# Patient Record
Sex: FEMALE | Race: WHITE | NOT HISPANIC OR LATINO | Employment: FULL TIME | URBAN - METROPOLITAN AREA
[De-identification: names, ages, dates, MRNs, and addresses within clinical notes are randomized per-mention and may not be internally consistent; named-entity substitution may affect disease eponyms.]

---

## 2017-06-21 ENCOUNTER — OFFICE VISIT (OUTPATIENT)
Dept: FAMILY MEDICINE | Facility: CLINIC | Age: 52
End: 2017-06-21
Payer: COMMERCIAL

## 2017-06-21 VITALS
OXYGEN SATURATION: 96 % | BODY MASS INDEX: 26.59 KG/M2 | HEIGHT: 62 IN | WEIGHT: 144.5 LBS | DIASTOLIC BLOOD PRESSURE: 90 MMHG | HEART RATE: 95 BPM | SYSTOLIC BLOOD PRESSURE: 172 MMHG | TEMPERATURE: 98 F

## 2017-06-21 DIAGNOSIS — I10 ESSENTIAL HYPERTENSION, BENIGN: Primary | ICD-10-CM

## 2017-06-21 PROCEDURE — 99999 PR PBB SHADOW E&M-EST. PATIENT-LVL III: CPT | Mod: PBBFAC,,, | Performed by: FAMILY MEDICINE

## 2017-06-21 PROCEDURE — 99214 OFFICE O/P EST MOD 30 MIN: CPT | Mod: S$GLB,,, | Performed by: FAMILY MEDICINE

## 2017-06-21 RX ORDER — AZITHROMYCIN 250 MG/1
TABLET, FILM COATED ORAL
COMMUNITY
Start: 2017-04-22 | End: 2017-06-21 | Stop reason: ALTCHOICE

## 2017-06-21 RX ORDER — LISINOPRIL 20 MG/1
20 TABLET ORAL DAILY
Qty: 30 TABLET | Refills: 3 | Status: SHIPPED | OUTPATIENT
Start: 2017-06-21 | End: 2017-08-22

## 2017-06-21 RX ORDER — HYDROCODONE BITARTRATE AND ACETAMINOPHEN 7.5; 325 MG/1; MG/1
TABLET ORAL
COMMUNITY
Start: 2017-03-31 | End: 2017-06-21

## 2017-06-21 RX ORDER — AZELASTINE 1 MG/ML
SPRAY, METERED NASAL
COMMUNITY
Start: 2017-04-22 | End: 2017-06-21 | Stop reason: ALTCHOICE

## 2017-06-21 RX ORDER — PREDNISONE 20 MG/1
TABLET ORAL
COMMUNITY
Start: 2017-04-22 | End: 2017-06-21 | Stop reason: ALTCHOICE

## 2017-06-21 RX ORDER — MELOXICAM 7.5 MG/1
TABLET ORAL
COMMUNITY
Start: 2017-03-31 | End: 2017-06-21

## 2017-06-21 RX ORDER — LISINOPRIL 5 MG/1
5 TABLET ORAL DAILY
COMMUNITY
End: 2017-06-21 | Stop reason: SDUPTHER

## 2017-06-21 RX ORDER — BENZONATATE 200 MG/1
CAPSULE ORAL
COMMUNITY
Start: 2017-04-22 | End: 2017-06-21 | Stop reason: ALTCHOICE

## 2017-06-21 NOTE — PROGRESS NOTES
[facility]  Progress Note    SUBJECTIVE:     Chief Complaint   Patient presents with    Medication Refill    Blood Pressure Check       HPI   Anusha Carranza  is a 52 y.o. female here for hypertension. She hasn't taken any medications today because she ran out. Has occasional blurry vision. She was told before that her bp was high but never followed up with anyone.    Review of patient's allergies indicates:   Allergen Reactions    Benadryl [diphenhydramine hcl]     Pcn [penicillins] Swelling       Past Medical History:   Diagnosis Date    Hypertension      Past Surgical History:   Procedure Laterality Date    HYSTERECTOMY       Family History   Problem Relation Age of Onset    Heart disease Mother     Diabetes Mother     Stroke Neg Hx      Social History   Substance Use Topics    Smoking status: Never Smoker    Smokeless tobacco: Not on file    Alcohol use No        Review of Systems   Constitutional: Negative for chills, fever and malaise/fatigue.   HENT: Negative.    Respiratory: Negative.  Negative for cough and shortness of breath.    Cardiovascular: Negative.  Negative for chest pain.   Gastrointestinal: Negative.  Negative for abdominal pain, nausea and vomiting.   Genitourinary: Negative.    Neurological: Negative for weakness and headaches.   All other systems reviewed and are negative.    OBJECTIVE:     Vitals:    06/21/17 1517   BP: (!) 172/90   Pulse: 95   Temp: 98 °F (36.7 °C)     Body mass index is 26.43 kg/m².    Physical Exam   Constitutional: She is oriented to person, place, and time and well-developed, well-nourished, and in no distress. No distress.   HENT:   Head: Normocephalic and atraumatic.   Eyes: Conjunctivae and EOM are normal.   Cardiovascular: Normal rate, regular rhythm and normal heart sounds.  Exam reveals no gallop and no friction rub.    No murmur heard.  Pulmonary/Chest: Effort normal and breath sounds normal. No respiratory distress. She has no wheezes. She  has no rales.   Abdominal: Soft. Bowel sounds are normal. She exhibits no distension. There is no tenderness.   Neurological: She is alert and oriented to person, place, and time.   Skin: Skin is warm. She is not diaphoretic.       Old records were reviewed. Labs and/or images were independently reviewed.    ASSESSMENT     1. Essential hypertension, benign        PLAN:     Essential hypertension, benign  -     Increase lisinopril (PRINIVIL,ZESTRIL) 20 MG tablet; Take 1 tablet (20 mg total) by mouth once daily.  Dispense: 30 tablet; Refill: 3  -     Educated patient to take medications as prescribed, and to record bp logs daily to bring along to next visit. Instructed patient to decrease salt intake. Also told patient to call if any new signs or symptoms develop.      RTC 1-2 weeks for BP management.    Bobby Allen MD  06/21/2017 3:34 PM

## 2017-06-29 ENCOUNTER — OFFICE VISIT (OUTPATIENT)
Dept: FAMILY MEDICINE | Facility: CLINIC | Age: 52
End: 2017-06-29
Payer: COMMERCIAL

## 2017-06-29 VITALS
TEMPERATURE: 98 F | BODY MASS INDEX: 26.59 KG/M2 | SYSTOLIC BLOOD PRESSURE: 126 MMHG | WEIGHT: 144.5 LBS | DIASTOLIC BLOOD PRESSURE: 80 MMHG | HEART RATE: 74 BPM | OXYGEN SATURATION: 97 % | HEIGHT: 62 IN

## 2017-06-29 DIAGNOSIS — Z00.00 ROUTINE PHYSICAL EXAMINATION: Primary | ICD-10-CM

## 2017-06-29 DIAGNOSIS — Z12.31 ENCOUNTER FOR SCREENING MAMMOGRAM FOR BREAST CANCER: ICD-10-CM

## 2017-06-29 DIAGNOSIS — I10 ESSENTIAL HYPERTENSION, BENIGN: ICD-10-CM

## 2017-06-29 PROCEDURE — 99999 PR PBB SHADOW E&M-EST. PATIENT-LVL III: CPT | Mod: PBBFAC,,, | Performed by: FAMILY MEDICINE

## 2017-06-29 PROCEDURE — 99396 PREV VISIT EST AGE 40-64: CPT | Mod: S$GLB,,, | Performed by: FAMILY MEDICINE

## 2017-06-29 NOTE — PROGRESS NOTES
[facility]  Progress Note    SUBJECTIVE:     Chief Complaint   Patient presents with    Hypertension    Follow-up       HPI   Anusha Carranza  is a 52 y.o. female here for routine physical exam. Doing well and has no new complaints/problems at this time. Her HTN been running well on lisinopril.    Review of patient's allergies indicates:   Allergen Reactions    Benadryl [diphenhydramine hcl]     Pcn [penicillins] Swelling       Past Medical History:   Diagnosis Date    Hypertension      Past Surgical History:   Procedure Laterality Date    HYSTERECTOMY       Family History   Problem Relation Age of Onset    Heart disease Mother     Diabetes Mother     Stroke Neg Hx      Social History   Substance Use Topics    Smoking status: Never Smoker    Smokeless tobacco: Not on file    Alcohol use No        Review of Systems   Constitutional: Negative for chills, diaphoresis and fever.   HENT: Negative for congestion, ear pain and sore throat.    Eyes: Negative for photophobia and discharge.   Respiratory: Negative for cough, shortness of breath and wheezing.    Cardiovascular: Negative for chest pain and palpitations.   Gastrointestinal: Negative for abdominal pain, constipation, diarrhea, nausea and vomiting.   Genitourinary: Negative for dysuria and hematuria.   Musculoskeletal: Negative for back pain and myalgias.   Skin: Negative for itching and rash.   Neurological: Negative for dizziness, sensory change, focal weakness, weakness and headaches.   All other systems reviewed and are negative.    OBJECTIVE:     Vitals:    06/29/17 0816   BP: 126/80   Pulse: 74   Temp: 98.2 °F (36.8 °C)     Body mass index is 26.43 kg/m².    Physical Exam   Constitutional: She is oriented to person, place, and time and well-developed, well-nourished, and in no distress. No distress.   HENT:   Head: Normocephalic and atraumatic.   Right Ear: Tympanic membrane is not perforated, not erythematous and not bulging. No  hemotympanum.   Left Ear: Tympanic membrane is not perforated, not erythematous and not bulging. No hemotympanum.   Mouth/Throat: Oropharynx is clear and moist. No oropharyngeal exudate.   Eyes: Conjunctivae and EOM are normal. Pupils are equal, round, and reactive to light.   Neck: No thyromegaly present.   Cardiovascular: Normal rate, regular rhythm and normal heart sounds.  Exam reveals no gallop and no friction rub.    No murmur heard.  Pulmonary/Chest: Effort normal and breath sounds normal. No respiratory distress. She has no wheezes. She has no rales.   Abdominal: Soft. Bowel sounds are normal. She exhibits no distension. There is no tenderness. There is no rebound and no guarding.   Musculoskeletal: Normal range of motion. She exhibits no edema or tenderness.   Lymphadenopathy:     She has no cervical adenopathy.   Neurological: She is alert and oriented to person, place, and time.   Skin: Skin is warm. No rash noted. She is not diaphoretic. No erythema.       Old records were reviewed. Labs and/or images were independently reviewed.    ASSESSMENT     1. Routine physical examination    2. Essential hypertension, benign    3. Encounter for screening mammogram for breast cancer        PLAN:     Routine physical examination  -     Lipid panel; Future; Expected date: 06/29/2017  -     CBC auto differential; Future  -     Comprehensive metabolic panel; Future  -     Hemoglobin A1c; Future  -     T4, free; Future  -     TSH; Future  -     Hepatitis panel, acute; Future; Expected date: 06/29/2017  -     HIV-1 and HIV-2 antibodies; Future; Expected date: 06/29/2017  -     RPR; Future; Expected date: 06/29/2017  -     We briefly discussed diet, exercise, and routine preventive exams. All questions and comments addressed.    Essential hypertension, benign   -     Stable. Continue current regimen.    Encounter for screening mammogram for breast cancer  -     Mammo Digital Screening Bilat with CAD; Future; Expected  date: 06/29/2017      RTC PRN    Bobby Allen MD  06/29/2017 8:43 AM

## 2017-07-06 ENCOUNTER — LAB VISIT (OUTPATIENT)
Dept: LAB | Facility: HOSPITAL | Age: 52
End: 2017-07-06
Attending: FAMILY MEDICINE
Payer: COMMERCIAL

## 2017-07-06 DIAGNOSIS — Z00.00 ROUTINE PHYSICAL EXAMINATION: ICD-10-CM

## 2017-07-06 LAB
ALBUMIN SERPL BCP-MCNC: 3.9 G/DL
ALP SERPL-CCNC: 110 U/L
ALT SERPL W/O P-5'-P-CCNC: 13 U/L
ANION GAP SERPL CALC-SCNC: 5 MMOL/L
AST SERPL-CCNC: 19 U/L
BASOPHILS # BLD AUTO: 0.02 K/UL
BASOPHILS NFR BLD: 0.3 %
BILIRUB SERPL-MCNC: 0.5 MG/DL
BUN SERPL-MCNC: 13 MG/DL
CALCIUM SERPL-MCNC: 9.9 MG/DL
CHLORIDE SERPL-SCNC: 103 MMOL/L
CHOLEST/HDLC SERPL: 3.7 {RATIO}
CO2 SERPL-SCNC: 34 MMOL/L
CREAT SERPL-MCNC: 0.8 MG/DL
DIFFERENTIAL METHOD: ABNORMAL
EOSINOPHIL # BLD AUTO: 0.2 K/UL
EOSINOPHIL NFR BLD: 2.6 %
ERYTHROCYTE [DISTWIDTH] IN BLOOD BY AUTOMATED COUNT: 12.7 %
EST. GFR  (AFRICAN AMERICAN): >60 ML/MIN/1.73 M^2
EST. GFR  (NON AFRICAN AMERICAN): >60 ML/MIN/1.73 M^2
GLUCOSE SERPL-MCNC: 95 MG/DL
HAV IGM SERPL QL IA: NEGATIVE
HBV CORE IGM SERPL QL IA: NEGATIVE
HBV SURFACE AG SERPL QL IA: NEGATIVE
HCT VFR BLD AUTO: 43.2 %
HCV AB SERPL QL IA: NEGATIVE
HDL/CHOLESTEROL RATIO: 26.8 %
HDLC SERPL-MCNC: 213 MG/DL
HDLC SERPL-MCNC: 57 MG/DL
HGB BLD-MCNC: 14.3 G/DL
HIV 1+2 AB+HIV1 P24 AG SERPL QL IA: NEGATIVE
LDLC SERPL CALC-MCNC: 131.6 MG/DL
LYMPHOCYTES # BLD AUTO: 2 K/UL
LYMPHOCYTES NFR BLD: 31.8 %
MCH RBC QN AUTO: 29.7 PG
MCHC RBC AUTO-ENTMCNC: 33.1 %
MCV RBC AUTO: 90 FL
MONOCYTES # BLD AUTO: 0.7 K/UL
MONOCYTES NFR BLD: 11.8 %
NEUTROPHILS # BLD AUTO: 3.3 K/UL
NEUTROPHILS NFR BLD: 53.3 %
NONHDLC SERPL-MCNC: 156 MG/DL
PLATELET # BLD AUTO: 351 K/UL
PMV BLD AUTO: 9.8 FL
POTASSIUM SERPL-SCNC: 4.6 MMOL/L
PROT SERPL-MCNC: 7.9 G/DL
RBC # BLD AUTO: 4.81 M/UL
SODIUM SERPL-SCNC: 142 MMOL/L
T4 FREE SERPL-MCNC: 1.02 NG/DL
TRIGL SERPL-MCNC: 122 MG/DL
TSH SERPL DL<=0.005 MIU/L-ACNC: 1.85 UIU/ML
WBC # BLD AUTO: 6.19 K/UL

## 2017-07-06 PROCEDURE — 84439 ASSAY OF FREE THYROXINE: CPT

## 2017-07-06 PROCEDURE — 86592 SYPHILIS TEST NON-TREP QUAL: CPT

## 2017-07-06 PROCEDURE — 85025 COMPLETE CBC W/AUTO DIFF WBC: CPT

## 2017-07-06 PROCEDURE — 80061 LIPID PANEL: CPT

## 2017-07-06 PROCEDURE — 80053 COMPREHEN METABOLIC PANEL: CPT

## 2017-07-06 PROCEDURE — 86703 HIV-1/HIV-2 1 RESULT ANTBDY: CPT

## 2017-07-06 PROCEDURE — 84443 ASSAY THYROID STIM HORMONE: CPT

## 2017-07-06 PROCEDURE — 80074 ACUTE HEPATITIS PANEL: CPT

## 2017-07-06 PROCEDURE — 83036 HEMOGLOBIN GLYCOSYLATED A1C: CPT

## 2017-07-06 PROCEDURE — 36415 COLL VENOUS BLD VENIPUNCTURE: CPT | Mod: PO

## 2017-07-07 LAB
ESTIMATED AVG GLUCOSE: 108 MG/DL
HBA1C MFR BLD HPLC: 5.4 %
RPR SER QL: NORMAL

## 2017-08-22 ENCOUNTER — TELEPHONE (OUTPATIENT)
Dept: FAMILY MEDICINE | Facility: CLINIC | Age: 52
End: 2017-08-22

## 2017-08-22 DIAGNOSIS — I10 ESSENTIAL HYPERTENSION, BENIGN: ICD-10-CM

## 2017-08-22 RX ORDER — LOSARTAN POTASSIUM 25 MG/1
25 TABLET ORAL DAILY
Qty: 90 TABLET | Refills: 3 | Status: SHIPPED | OUTPATIENT
Start: 2017-08-22 | End: 2017-08-23

## 2017-08-22 NOTE — TELEPHONE ENCOUNTER
----- Message from Autumn Pitts sent at 8/22/2017  9:40 AM CDT -----  Patient states blood pressure medication is causing her to have a cough/ticke in throat. Please call at [phone] thank you!

## 2017-08-22 NOTE — TELEPHONE ENCOUNTER
Spoke w/patient, requesting a different B/P medication. States Lisinopril causes her to cough constantly. Please advise.

## 2017-08-23 RX ORDER — LISINOPRIL 20 MG/1
20 TABLET ORAL DAILY
Qty: 30 TABLET | Refills: 3 | Status: SHIPPED | OUTPATIENT
Start: 2017-08-23 | End: 2017-09-12 | Stop reason: SINTOL

## 2017-09-12 ENCOUNTER — OFFICE VISIT (OUTPATIENT)
Dept: FAMILY MEDICINE | Facility: CLINIC | Age: 52
End: 2017-09-12
Payer: COMMERCIAL

## 2017-09-12 VITALS
OXYGEN SATURATION: 97 % | HEIGHT: 62 IN | HEART RATE: 98 BPM | BODY MASS INDEX: 26.64 KG/M2 | WEIGHT: 144.75 LBS | DIASTOLIC BLOOD PRESSURE: 82 MMHG | SYSTOLIC BLOOD PRESSURE: 122 MMHG | TEMPERATURE: 98 F

## 2017-09-12 DIAGNOSIS — I10 ESSENTIAL HYPERTENSION, BENIGN: Primary | ICD-10-CM

## 2017-09-12 PROCEDURE — 99214 OFFICE O/P EST MOD 30 MIN: CPT | Mod: S$GLB,,, | Performed by: FAMILY MEDICINE

## 2017-09-12 PROCEDURE — 3008F BODY MASS INDEX DOCD: CPT | Mod: S$GLB,,, | Performed by: FAMILY MEDICINE

## 2017-09-12 PROCEDURE — 99999 PR PBB SHADOW E&M-EST. PATIENT-LVL III: CPT | Mod: PBBFAC,,, | Performed by: FAMILY MEDICINE

## 2017-09-12 PROCEDURE — 3074F SYST BP LT 130 MM HG: CPT | Mod: S$GLB,,, | Performed by: FAMILY MEDICINE

## 2017-09-12 PROCEDURE — 3079F DIAST BP 80-89 MM HG: CPT | Mod: S$GLB,,, | Performed by: FAMILY MEDICINE

## 2017-09-12 RX ORDER — AMLODIPINE BESYLATE 5 MG/1
5 TABLET ORAL DAILY
Qty: 90 TABLET | Refills: 3 | Status: SHIPPED | OUTPATIENT
Start: 2017-09-12 | End: 2018-10-16

## 2017-09-12 NOTE — PROGRESS NOTES
[facility]  Progress Note    SUBJECTIVE:     Chief Complaint   Patient presents with    Medication Reaction     b/p meds causing headaches       HPI   Anusha Carranza  is a 52 y.o. female here for blood pressure issues. She has been having a dry cough recently for the past couple weeks. No sputum production, shortness of breath, fevers, chills. No known sick contacts.    Review of patient's allergies indicates:   Allergen Reactions    Benadryl [diphenhydramine hcl]     Pcn [penicillins] Swelling       Past Medical History:   Diagnosis Date    Hypertension      Past Surgical History:   Procedure Laterality Date    HYSTERECTOMY       Family History   Problem Relation Age of Onset    Heart disease Mother     Diabetes Mother     Stroke Neg Hx      Social History   Substance Use Topics    Smoking status: Never Smoker    Smokeless tobacco: Never Used    Alcohol use No        Review of Systems   Constitutional: Negative for chills, fever and malaise/fatigue.   HENT: Negative.    Respiratory: Positive for cough (dry). Negative for shortness of breath.    Cardiovascular: Negative.  Negative for chest pain.   Gastrointestinal: Negative.  Negative for abdominal pain, nausea and vomiting.   Genitourinary: Negative.    Neurological: Negative for weakness and headaches.   All other systems reviewed and are negative.    OBJECTIVE:     Vitals:    09/12/17 1557   BP: 122/82   Pulse: 98   Temp: 98.4 °F (36.9 °C)     Body mass index is 26.46 kg/m².    Physical Exam   Constitutional: She is oriented to person, place, and time and well-developed, well-nourished, and in no distress. No distress.   HENT:   Head: Normocephalic and atraumatic.   Nose: Nose normal.   Eyes: Conjunctivae and EOM are normal.   Cardiovascular: Normal rate, regular rhythm and normal heart sounds.  Exam reveals no gallop and no friction rub.    No murmur heard.  Pulmonary/Chest: Effort normal and breath sounds normal. No respiratory distress.  She has no wheezes. She has no rales. She exhibits no tenderness.   Abdominal: Soft. Bowel sounds are normal. She exhibits no distension. There is no tenderness. There is no rebound.   Neurological: She is alert and oriented to person, place, and time.   Skin: Skin is warm. She is not diaphoretic.       Old records were reviewed. Labs and/or images were independently reviewed.    ASSESSMENT     1. Essential hypertension, benign        PLAN:     Essential hypertension, benign  -     Start amlodipine (NORVASC) 5 MG tablet; Take 1 tablet (5 mg total) by mouth once daily.  Dispense: 90 tablet; Refill: 3  -     Stop lisinpril due to dry cough.  -     Educated patient to take medications as prescribed, and to record bp logs daily to bring along to next visit. Instructed patient to decrease salt intake. Also told patient to call if any new signs or symptoms develop.    RTC PRN or 2 weeks for follow-up.    Bobby Allen MD  09/12/2017 5:35 PM

## 2017-09-26 ENCOUNTER — TELEPHONE (OUTPATIENT)
Dept: FAMILY MEDICINE | Facility: CLINIC | Age: 52
End: 2017-09-26

## 2017-09-26 NOTE — TELEPHONE ENCOUNTER
----- Message from Brandon Bobby sent at 9/26/2017 11:54 AM CDT -----  Contact: Self/[phone]  Patient states that the medication:  amlodipine (NORVASC) 5 MG tablet has her very fatigue. Patient would like to speak to the staff regarding this matter.  Thank you.

## 2017-09-26 NOTE — TELEPHONE ENCOUNTER
Left message for patient to call office.     I, Lovely Velasquez, performed the initial face to face bedside interview with this patient regarding history of present illness, review of symptoms and relevant past medical, social and family history.  I completed an independent physical examination.  I was the initial provider who evaluated this patient. I have signed out the follow up of any pending tests (i.e. labs, radiological studies) to the ACP.  I have communicated the patient’s plan of care and disposition with the ACP.

## 2017-09-27 NOTE — TELEPHONE ENCOUNTER
Spoke w/patient, states the medication Amlodipine is causing her to be fatigue. States it makes her feel like she drunk or loaded. B/P readings before taking medication: 132/78, 132/84, 136/84, 132/78. No complaints of symptoms verbalized. Please advise.

## 2017-09-27 NOTE — TELEPHONE ENCOUNTER
----- Message from Brandon Bobby sent at 9/26/2017 11:54 AM CDT -----  Contact: Self/[phone]  Patient states that the medication:  amlodipine (NORVASC) 5 MG tablet has her very fatigue. Patient would like to speak to the staff regarding this matter.  Thank you.

## 2018-08-23 RX ORDER — LOSARTAN POTASSIUM 25 MG/1
TABLET ORAL
Qty: 90 TABLET | Refills: 2 | OUTPATIENT
Start: 2018-08-23

## 2018-08-31 DIAGNOSIS — Z12.39 BREAST CANCER SCREENING: ICD-10-CM

## 2018-09-26 ENCOUNTER — OFFICE VISIT (OUTPATIENT)
Dept: OBSTETRICS AND GYNECOLOGY | Facility: CLINIC | Age: 53
End: 2018-09-26
Payer: COMMERCIAL

## 2018-09-26 VITALS
BODY MASS INDEX: 26.74 KG/M2 | HEIGHT: 62 IN | SYSTOLIC BLOOD PRESSURE: 160 MMHG | DIASTOLIC BLOOD PRESSURE: 80 MMHG | WEIGHT: 145.31 LBS

## 2018-09-26 DIAGNOSIS — N95.2 VAGINAL ATROPHY: Primary | ICD-10-CM

## 2018-09-26 DIAGNOSIS — N89.8 VAGINAL DISCHARGE: ICD-10-CM

## 2018-09-26 PROCEDURE — 99999 PR PBB SHADOW E&M-EST. PATIENT-LVL III: CPT | Mod: PBBFAC,,, | Performed by: OBSTETRICS & GYNECOLOGY

## 2018-09-26 PROCEDURE — 87491 CHLMYD TRACH DNA AMP PROBE: CPT

## 2018-09-26 PROCEDURE — 3008F BODY MASS INDEX DOCD: CPT | Mod: CPTII,S$GLB,, | Performed by: OBSTETRICS & GYNECOLOGY

## 2018-09-26 PROCEDURE — 87660 TRICHOMONAS VAGIN DIR PROBE: CPT

## 2018-09-26 PROCEDURE — 99204 OFFICE O/P NEW MOD 45 MIN: CPT | Mod: S$GLB,,, | Performed by: OBSTETRICS & GYNECOLOGY

## 2018-09-26 RX ORDER — ESTRADIOL 0.1 MG/G
1 CREAM VAGINAL NIGHTLY
Qty: 42.5 G | Refills: 1 | Status: SHIPPED | OUTPATIENT
Start: 2018-09-26 | End: 2019-02-04

## 2018-09-26 RX ORDER — LEVOTHYROXINE SODIUM 50 UG/1
50 TABLET ORAL DAILY
Refills: 1 | COMMUNITY
Start: 2018-09-18 | End: 2019-03-25 | Stop reason: SDUPTHER

## 2018-09-26 RX ORDER — LOSARTAN POTASSIUM 25 MG/1
25 TABLET ORAL DAILY
Refills: 0 | COMMUNITY
Start: 2018-09-21 | End: 2018-10-16 | Stop reason: SDUPTHER

## 2018-09-26 NOTE — PROGRESS NOTES
"History & Physical  Gynecology      SUBJECTIVE:     Chief Complaint: Consult ( painful intercourse Was told she in the first stage of menopause)       History of Present Illness:  Ms. Carranza is a 53 year old female who presents to clinic for second opinion about estrogen suppository that was prescribed for vaginal atrophy by Dr. Pennington at [facility]. She reports that she has been experiencing discomfort with intercourse. She would have vaginal spotting after intercourse on certain occasions. She reports that Dr. Pennington suggested that the vaginal irritation and dyspareunia that she has been experiencing is likely due to postmenopausal vaginal atrophy. She is concerned with the risk of cancer with the use of estrogen. She also complains that she has been noticing thick white discharge when she wipes. She denies foul smell and itching. She admits to douching 2-3x a week just so that she does not feel "dirty."      Review of patient's allergies indicates:   Allergen Reactions    Benadryl [diphenhydramine hcl]     Pcn [penicillins] Swelling       Past Medical History:   Diagnosis Date    Hypertension      Past Surgical History:   Procedure Laterality Date    CHOLECYSTECTOMY      EYE SURGERY Bilateral 2009    HYSTERECTOMY       OB History      Para Term  AB Living    4         4    SAB TAB Ectopic Multiple Live Births                     Family History   Problem Relation Age of Onset    Heart disease Mother     Diabetes Mother     Stroke Neg Hx      Social History     Tobacco Use    Smoking status: Never Smoker    Smokeless tobacco: Never Used   Substance Use Topics    Alcohol use: Yes     Frequency: Monthly or less    Drug use: No       Current Outpatient Medications   Medication Sig    levothyroxine (SYNTHROID) 50 MCG tablet Take 50 mcg by mouth once daily.    losartan (COZAAR) 25 MG tablet Take 25 mg by mouth once daily.    amlodipine (NORVASC) 5 MG tablet Take 1 tablet (5 mg total) by " mouth once daily.    estradiol (ESTRACE) 0.01 % (0.1 mg/gram) vaginal cream Place 1 g vaginally every evening.     No current facility-administered medications for this visit.      Review of Systems:  Review of Systems   Constitutional: Negative for chills and fever.   Eyes: Negative for visual disturbance.   Respiratory: Negative for cough and wheezing.    Cardiovascular: Negative for chest pain and palpitations.   Gastrointestinal: Negative for abdominal pain, nausea and vomiting.   Genitourinary: Positive for dyspareunia, vaginal bleeding and vaginal discharge. Negative for dysuria, frequency, hematuria, pelvic pain and vaginal pain.        Vaginal spotting after intercourse   Neurological: Negative for headaches.        OBJECTIVE:     Physical Exam:  Physical Exam   Constitutional: She is oriented to person, place, and time. She appears well-developed and well-nourished. No distress.   Cardiovascular: Normal rate and normal heart sounds.   Pulmonary/Chest: Effort normal. No respiratory distress.   Abdominal: Soft. There is no tenderness.   Genitourinary: Vaginal discharge found.   Genitourinary Comments: Uterus and cervix surgically absent. Vulvar and vaginal tissue dry and thinned. Vaginal cuff intact     Neurological: She is alert and oriented to person, place, and time.   Skin: Skin is warm and dry.   Psychiatric: She has a normal mood and affect.   Vitals reviewed.      ASSESSMENT:       ICD-10-CM ICD-9-CM    1. Vaginal atrophy N95.2 627.3 estradiol (ESTRACE) 0.01 % (0.1 mg/gram) vaginal cream   2. Vaginal discharge N89.8 623.5 Vaginosis Screen by DNA Probe      C. trachomatis/N. gonorrhoeae by AMP DNA      Plan:      Anusha was seen today for consult.    Diagnoses and all orders for this visit:    Vaginal atrophy  -     estradiol (ESTRACE) 0.01 % (0.1 mg/gram) vaginal cream; Place 1 g vaginally every evening.  - Discussed with patient the effects of menopause on vaginal tissue. Suggested estrogen cream for  vaginal atrophy, dyspareunia and irritation with intercourse. Discussed the side effects of systemic estrogen vs. Vaginal estrogen.    Vaginal discharge  -     Vaginosis Screen by DNA Probe  -     C. trachomatis/N. gonorrhoeae by AMP DNA        Orders Placed This Encounter   Procedures    Vaginosis Screen by DNA Probe    C. trachomatis/N. gonorrhoeae by AMP DNA       Follow-up if symptoms worsen or fail to improve.     Counseling time: 30 minutes    Alicia Stevenson

## 2018-09-26 NOTE — PATIENT INSTRUCTIONS
Understanding Menopause  Menopause marks the point where youve gone 12 months in a row without a period. The average age for this is around 51, but it can happen at younger or older ages. During the months or years before menopause, your body goes through many changes. It may be helpful to understand these changes and what you can do about the symptoms that result.     Use a portable fan to help stay cool.    Symptoms  Perimenopause is sometimes called the menopause transition. It happens in the months or years before menopause. It may begin when you reach your mid-40s. During this time, your estrogen levels go up and down and then decrease. As a result, you may notice some of the following symptoms:  · Menstrual periods that come more or less often than usual  · Menstrual periods that are lighter or heavier than normal  · Increased premenstrual syndrome (PMS) symptoms  · Hot flashes  · Night sweats  · Mood swings  · Vaginal dryness with possible painful sexual activity  · Difficulty going to sleep or staying asleep  · Decreased sexual drive and function  · Urinating frequently  It is important to remember that you could become pregnant until 12 months have passed since your last menstrual period. Ask your healthcare provider about birth control choices.   Controlling symptoms  Your healthcare provider may suggest pills or an intrauterine device (IUD) that contain the hormone progesterone. This can make your periods more regular and prevent excess bleeding. If you have symptoms due to lower estrogen levels, your healthcare provider may suggest pills that contain estrogen and/or progesterone. This is called hormone therapy (HT).  There are also other prescription medicines that help control some of the bothersome symptoms, like hot flashes, mood swings, and vaginal dryness.  Other ways for you to deal with symptoms are listed below.  · Hot flashes. Wear layers that you can remove. Try all-cotton clothing, sheets,  and blankets. Keep a glass of cold water by your bed.  · Pain during sex. You can buy a water-based lubricant or vaginal moisturizer in the drugstore that may help. Your healthcare provider may also prescribe an estrogen cream for your vagina.  · Mood swings. Talking to friends who are going through the same changes can sometimes help.  Date Last Reviewed: 12/1/2016  © 1815-2413 [employer]. [address] [place] [address], [place] [address]. All rights reserved. This information is not intended as a substitute for professional medical care. Always follow your healthcare professional's instructions.

## 2018-09-27 ENCOUNTER — OFFICE VISIT (OUTPATIENT)
Dept: URGENT CARE | Facility: CLINIC | Age: 53
End: 2018-09-27
Payer: COMMERCIAL

## 2018-09-27 VITALS
BODY MASS INDEX: 26.68 KG/M2 | HEIGHT: 62 IN | HEART RATE: 85 BPM | OXYGEN SATURATION: 99 % | WEIGHT: 145 LBS | RESPIRATION RATE: 18 BRPM | SYSTOLIC BLOOD PRESSURE: 154 MMHG | TEMPERATURE: 98 F | DIASTOLIC BLOOD PRESSURE: 89 MMHG

## 2018-09-27 DIAGNOSIS — M79.602 LEFT ARM PAIN: Primary | ICD-10-CM

## 2018-09-27 LAB
C TRACH DNA SPEC QL NAA+PROBE: NOT DETECTED
CANDIDA RRNA VAG QL PROBE: NEGATIVE
G VAGINALIS RRNA GENITAL QL PROBE: POSITIVE
N GONORRHOEA DNA SPEC QL NAA+PROBE: NOT DETECTED
T VAGINALIS RRNA GENITAL QL PROBE: NEGATIVE

## 2018-09-27 PROCEDURE — 3077F SYST BP >= 140 MM HG: CPT | Mod: CPTII,S$GLB,, | Performed by: PHYSICIAN ASSISTANT

## 2018-09-27 PROCEDURE — 99214 OFFICE O/P EST MOD 30 MIN: CPT | Mod: S$GLB,,, | Performed by: PHYSICIAN ASSISTANT

## 2018-09-27 PROCEDURE — 3079F DIAST BP 80-89 MM HG: CPT | Mod: CPTII,S$GLB,, | Performed by: PHYSICIAN ASSISTANT

## 2018-09-27 RX ORDER — SULFAMETHOXAZOLE AND TRIMETHOPRIM 800; 160 MG/1; MG/1
1 TABLET ORAL 2 TIMES DAILY
Qty: 14 TABLET | Refills: 0 | Status: SHIPPED | OUTPATIENT
Start: 2018-09-27 | End: 2018-10-16 | Stop reason: ALTCHOICE

## 2018-09-27 NOTE — PROGRESS NOTES
"Subjective:       Patient ID: Anusha Carranza is a 53 y.o. female.    Vitals:  height is 5' 2" (1.575 m) and weight is 65.8 kg (145 lb). Her temperature is 98.1 °F (36.7 °C). Her blood pressure is 154/89 (abnormal) and her pulse is 85. Her respiration is 18 and oxygen saturation is 99%.     Chief Complaint: Arm Pain    53-year-old female presents with left elbow and arm pain for 1 week.  She reports receiving a steroid injection into the lateral epicondyle.  Since then the pain has been getting worse and her forearm arm and hand has started to swell.  She denies fevers or chills or myalgias.       Arm Pain    The incident occurred 5 to 7 days ago. The incident occurred at home. Injury mechanism: injection to Elbow. The pain is present in the left elbow and left forearm. The quality of the pain is described as aching. The pain radiates to the left arm. The pain is at a severity of 10/10. The pain is severe. The pain has been constant since the incident. Pertinent negatives include no chest pain. The symptoms are aggravated by movement, palpation and lifting. She has tried heat for the symptoms. The treatment provided no relief.     Review of Systems   Constitution: Negative for chills and fever.   HENT: Negative for sore throat.    Eyes: Negative for blurred vision.   Cardiovascular: Negative for chest pain.   Respiratory: Negative for shortness of breath.    Skin: Negative for rash.   Musculoskeletal: Positive for joint pain and joint swelling. Negative for back pain.   Gastrointestinal: Negative for abdominal pain, diarrhea, nausea and vomiting.   Neurological: Negative for headaches.   Psychiatric/Behavioral: The patient is not nervous/anxious.    All other systems reviewed and are negative.      Objective:      Physical Exam   Constitutional: She appears well-developed and well-nourished. She is active. No distress.   HENT:   Head: Normocephalic and atraumatic.   Right Ear: Hearing and external ear normal.   Left " Ear: Hearing and external ear normal.   Nose: Nose normal. No nasal deformity. No epistaxis.   Mouth/Throat: Oropharynx is clear and moist and mucous membranes are normal.   Eyes: Conjunctivae and lids are normal. No scleral icterus.   Neck: Trachea normal and normal range of motion.   Cardiovascular: Intact distal pulses and normal pulses.   Pulmonary/Chest: Effort normal. No stridor. No respiratory distress.   Musculoskeletal:        Left elbow: She exhibits swelling. She exhibits normal range of motion. Tenderness found. Radial head and lateral epicondyle tenderness noted.        Right forearm: She exhibits no tenderness and no swelling (23 cm middle 1/3).        Left forearm: She exhibits tenderness (warmth without erythema. ) and swelling (24.5 cm middle 1/3).   Neurological: She is alert. She has normal strength. She is not disoriented. No sensory deficit. GCS eye subscore is 4. GCS verbal subscore is 5. GCS motor subscore is 6.   Skin: Skin is warm, dry and intact. Capillary refill takes less than 2 seconds. She is not diaphoretic.   Psychiatric: She has a normal mood and affect. Her speech is normal and behavior is normal. She is attentive.   Nursing note and vitals reviewed.      Assessment:       1. Left arm pain        Plan:       Pt instructed to F/U with Dr. Tiffanie JUDGE. I suspect iatrogenic infectious process.           Left arm pain  -     sulfamethoxazole-trimethoprim 800-160mg (BACTRIM DS) 800-160 mg Tab; Take 1 tablet by mouth 2 (two) times daily.  Dispense: 14 tablet; Refill: 0      Patient Instructions   Please return here or go to the Emergency Department for any concerns or worsening of condition.  If you were prescribed antibiotics, please take them to completion.  If you were prescribed a narcotic medication, do not drive or operate heavy equipment or machinery while taking these medications.  Please follow up with your primary care doctor or specialist as needed.    If you  smoke, please stop  smoking.

## 2018-10-16 ENCOUNTER — OFFICE VISIT (OUTPATIENT)
Dept: FAMILY MEDICINE | Facility: CLINIC | Age: 53
End: 2018-10-16
Payer: COMMERCIAL

## 2018-10-16 VITALS
DIASTOLIC BLOOD PRESSURE: 92 MMHG | SYSTOLIC BLOOD PRESSURE: 170 MMHG | HEIGHT: 62 IN | HEART RATE: 81 BPM | OXYGEN SATURATION: 97 % | TEMPERATURE: 98 F | BODY MASS INDEX: 27.14 KG/M2 | WEIGHT: 147.5 LBS

## 2018-10-16 DIAGNOSIS — Z12.31 ENCOUNTER FOR SCREENING MAMMOGRAM FOR BREAST CANCER: ICD-10-CM

## 2018-10-16 DIAGNOSIS — I10 ESSENTIAL HYPERTENSION, BENIGN: Primary | ICD-10-CM

## 2018-10-16 PROCEDURE — 3080F DIAST BP >= 90 MM HG: CPT | Mod: CPTII,S$GLB,, | Performed by: FAMILY MEDICINE

## 2018-10-16 PROCEDURE — 3077F SYST BP >= 140 MM HG: CPT | Mod: CPTII,S$GLB,, | Performed by: FAMILY MEDICINE

## 2018-10-16 PROCEDURE — 99999 PR PBB SHADOW E&M-EST. PATIENT-LVL III: CPT | Mod: PBBFAC,,, | Performed by: FAMILY MEDICINE

## 2018-10-16 PROCEDURE — 3008F BODY MASS INDEX DOCD: CPT | Mod: CPTII,S$GLB,, | Performed by: FAMILY MEDICINE

## 2018-10-16 PROCEDURE — 99214 OFFICE O/P EST MOD 30 MIN: CPT | Mod: S$GLB,,, | Performed by: FAMILY MEDICINE

## 2018-10-16 RX ORDER — INDOMETHACIN 25 MG/1
CAPSULE ORAL
Refills: 0 | COMMUNITY
Start: 2018-09-28 | End: 2019-02-04

## 2018-10-16 RX ORDER — DICLOFENAC SODIUM 10 MG/G
GEL TOPICAL
Refills: 0 | COMMUNITY
Start: 2018-10-11 | End: 2019-02-04

## 2018-10-16 RX ORDER — HYDROCODONE BITARTRATE AND ACETAMINOPHEN 7.5; 325 MG/1; MG/1
1 TABLET ORAL EVERY 12 HOURS PRN
Refills: 0 | COMMUNITY
Start: 2018-09-20 | End: 2019-02-04

## 2018-10-16 RX ORDER — LOSARTAN POTASSIUM 50 MG/1
50 TABLET ORAL DAILY
Qty: 90 TABLET | Refills: 3 | Status: SHIPPED | OUTPATIENT
Start: 2018-10-16 | End: 2019-03-25

## 2018-10-16 RX ORDER — MELOXICAM 7.5 MG/1
TABLET ORAL
Refills: 0 | COMMUNITY
Start: 2018-09-20 | End: 2019-02-04

## 2018-10-16 NOTE — PROGRESS NOTES
[facility]  Progress Note    SUBJECTIVE:     Chief Complaint   Patient presents with    Hypertension       HPI   Anusha Carranza  is a 53 y.o. female here for follow-up of her hypertension. Takes her meds when she remembers. Doesn't try to diet or exercise. Patient has no other new complaints/problems at this time.      Review of patient's allergies indicates:   Allergen Reactions    Benadryl [diphenhydramine hcl]     Pcn [penicillins] Swelling       Past Medical History:   Diagnosis Date    Hypertension      Past Surgical History:   Procedure Laterality Date    CHOLECYSTECTOMY      EYE SURGERY Bilateral 2009    HYSTERECTOMY       Family History   Problem Relation Age of Onset    Heart disease Mother     Diabetes Mother     Stroke Neg Hx      Social History     Tobacco Use    Smoking status: Never Smoker    Smokeless tobacco: Never Used   Substance Use Topics    Alcohol use: Yes     Frequency: Monthly or less    Drug use: No        Review of Systems   Constitutional: Negative for chills, fever and malaise/fatigue.   HENT: Negative.    Respiratory: Negative.  Negative for cough and shortness of breath.    Cardiovascular: Negative.  Negative for chest pain.   Gastrointestinal: Negative.  Negative for abdominal pain, nausea and vomiting.   Genitourinary: Negative.    Neurological: Negative for weakness and headaches.   All other systems reviewed and are negative.    OBJECTIVE:     Vitals:    10/16/18 1320   BP: (!) 170/92   Pulse: 81   Temp: 98.3 °F (36.8 °C)     Body mass index is 26.98 kg/m².    Physical Exam   Constitutional: She is oriented to person, place, and time and well-developed, well-nourished, and in no distress. No distress.   HENT:   Head: Normocephalic and atraumatic.   Nose: Nose normal.   Eyes: Conjunctivae and EOM are normal.   Cardiovascular: Normal rate, regular rhythm and normal heart sounds. Exam reveals no gallop and no friction rub.   No murmur heard.  Pulmonary/Chest:  Effort normal and breath sounds normal. No respiratory distress. She has no wheezes. She has no rales. She exhibits no tenderness.   Abdominal: Soft. Bowel sounds are normal. She exhibits no distension. There is no tenderness. There is no rebound.   Neurological: She is alert and oriented to person, place, and time.   Skin: Skin is warm. She is not diaphoretic.       Old records were reviewed. Labs and/or images were independently reviewed.    ASSESSMENT     1. Essential hypertension, benign    2. Encounter for screening mammogram for breast cancer        PLAN:     Essential hypertension, benign  -     Increase losartan (COZAAR) 50 MG tablet; Take 1 tablet (50 mg total) by mouth once daily.  Dispense: 90 tablet; Refill: 3  -     Educated patient to take medications as prescribed, and to record bp logs daily to bring along to next visit. Instructed patient to decrease salt intake. Also told patient to call if any new signs or symptoms develop.    Encounter for screening mammogram for breast cancer  -     Mammo Digital Screening Bilat with CAD; Future; Expected date: 10/16/2018      RTC PRN, or 1 week for nursing bp visit.    Bobby Allen MD  10/16/2018 1:43 PM

## 2019-02-04 ENCOUNTER — OFFICE VISIT (OUTPATIENT)
Dept: OBSTETRICS AND GYNECOLOGY | Facility: CLINIC | Age: 54
End: 2019-02-04
Attending: STUDENT IN AN ORGANIZED HEALTH CARE EDUCATION/TRAINING PROGRAM
Payer: COMMERCIAL

## 2019-02-04 VITALS
SYSTOLIC BLOOD PRESSURE: 162 MMHG | HEIGHT: 62 IN | WEIGHT: 143.63 LBS | DIASTOLIC BLOOD PRESSURE: 90 MMHG | BODY MASS INDEX: 26.43 KG/M2

## 2019-02-04 DIAGNOSIS — B37.9 YEAST INFECTION: ICD-10-CM

## 2019-02-04 DIAGNOSIS — N95.0 POST-MENOPAUSAL BLEEDING: ICD-10-CM

## 2019-02-04 DIAGNOSIS — Z12.39 BREAST CANCER SCREENING: Primary | ICD-10-CM

## 2019-02-04 DIAGNOSIS — N95.2 VAGINAL ATROPHY: ICD-10-CM

## 2019-02-04 LAB
BILIRUB SERPL-MCNC: NORMAL MG/DL
BLOOD URINE, POC: NORMAL
COLOR, POC UA: YELLOW
GLUCOSE UR QL STRIP: NORMAL
KETONES UR QL STRIP: NORMAL
LEUKOCYTE ESTERASE URINE, POC: NORMAL
NITRITE, POC UA: NORMAL
PH, POC UA: 7
PROTEIN, POC: NORMAL
SPECIFIC GRAVITY, POC UA: 1
UROBILINOGEN, POC UA: NORMAL

## 2019-02-04 PROCEDURE — 99214 PR OFFICE/OUTPT VISIT, EST, LEVL IV, 30-39 MIN: ICD-10-PCS | Mod: 25,S$GLB,, | Performed by: STUDENT IN AN ORGANIZED HEALTH CARE EDUCATION/TRAINING PROGRAM

## 2019-02-04 PROCEDURE — 87086 URINE CULTURE/COLONY COUNT: CPT

## 2019-02-04 PROCEDURE — 99214 OFFICE O/P EST MOD 30 MIN: CPT | Mod: 25,S$GLB,, | Performed by: STUDENT IN AN ORGANIZED HEALTH CARE EDUCATION/TRAINING PROGRAM

## 2019-02-04 PROCEDURE — 81002 POCT URINE DIPSTICK WITHOUT MICROSCOPE: ICD-10-PCS | Mod: S$GLB,,, | Performed by: STUDENT IN AN ORGANIZED HEALTH CARE EDUCATION/TRAINING PROGRAM

## 2019-02-04 PROCEDURE — 99999 PR PBB SHADOW E&M-EST. PATIENT-LVL IV: ICD-10-PCS | Mod: PBBFAC,,, | Performed by: STUDENT IN AN ORGANIZED HEALTH CARE EDUCATION/TRAINING PROGRAM

## 2019-02-04 PROCEDURE — 81002 URINALYSIS NONAUTO W/O SCOPE: CPT | Mod: S$GLB,,, | Performed by: STUDENT IN AN ORGANIZED HEALTH CARE EDUCATION/TRAINING PROGRAM

## 2019-02-04 PROCEDURE — 87510 GARDNER VAG DNA DIR PROBE: CPT

## 2019-02-04 PROCEDURE — 87480 CANDIDA DNA DIR PROBE: CPT

## 2019-02-04 PROCEDURE — 99999 PR PBB SHADOW E&M-EST. PATIENT-LVL IV: CPT | Mod: PBBFAC,,, | Performed by: STUDENT IN AN ORGANIZED HEALTH CARE EDUCATION/TRAINING PROGRAM

## 2019-02-04 RX ORDER — FLUCONAZOLE 150 MG/1
150 TABLET ORAL ONCE
Qty: 2 TABLET | Refills: 1 | Status: SHIPPED | OUTPATIENT
Start: 2019-02-04 | End: 2019-02-04

## 2019-02-04 NOTE — PROGRESS NOTES
Chief Complaint: Vaginal dryness     HPI:      Anusha Carranza is a 54 y.o.  who presents today for evaluation of vaginal dryness.  LMP: No LMP recorded. Patient has had a hysterectomy.  She reports some spotting following intercourse that she notices when she wipes.  She also reports some swelling and irritation after intercourse.  This has been happening intermittently for the past year.  Does report some pain with intercourse - especially with initial penetration.  Notices she is dry all of the time.  Has some relief with Vaseline for lubrication.  Also reports some vaginal discharge.  No other issues, problems, or complaints. Specifically, patient denies pelvic pain, urinary problems, or changes in appetite.   She denies hot flushes, mood changes.  Ms. Carranza is currently sexually active with a single male partner.      Previous Pap: No result found - patient has had a hysterectomy, all paps normal previously  Previous Mammogram: Normal 2 years ago  Most Recent Dexa: NA  Colonoscopy: Normal 6 years ago    GYN Hx:  Menarche 12.  Menopause 38.  Denies any history of abnormal pap smears or STIs.  Denies history of abnormal or heavy menses.      OB History      Para Term  AB Living    4 4 4     4    SAB TAB Ectopic Multiple Live Births            4        Obstetric Comments    Menarche at 12.  BB 6# 14 oz        Past Medical History:   Diagnosis Date    Hypertension      Past Surgical History:   Procedure Laterality Date    CHOLECYSTECTOMY      CHOLECYSTECTOMY      EYE SURGERY Bilateral 2009    HYSTERECTOMY      [facility]     Social History     Socioeconomic History    Marital status:      Spouse name: Not on file    Number of children: Not on file    Years of education: Not on file    Highest education level: Not on file   Social Needs    Financial resource strain: Not on file    Food insecurity - worry: Not on file    Food insecurity - inability: Not on file    Transportation  "needs - medical: Not on file    Transportation needs - non-medical: Not on file   Occupational History    Not on file   Tobacco Use    Smoking status: Never Smoker    Smokeless tobacco: Never Used   Substance and Sexual Activity    Alcohol use: Yes     Frequency: Monthly or less    Drug use: No    Sexual activity: Yes     Partners: Male   Other Topics Concern    Not on file   Social History Narrative    Not on file     Family History   Problem Relation Age of Onset    Heart disease Mother     Diabetes Mother     Stroke Neg Hx        Current Outpatient Medications:     levothyroxine (SYNTHROID) 50 MCG tablet, Take 50 mcg by mouth once daily., Disp: , Rfl: 1    losartan (COZAAR) 50 MG tablet, Take 1 tablet (50 mg total) by mouth once daily., Disp: 90 tablet, Rfl: 3    ROS:     GENERAL: Denies unintentional weight gain or weight loss. Feeling well overall.   SKIN: Denies rash or lesions.   HEENT: Denies headaches, or vision changes.   CARDIOVASCULAR: Denies palpitations or chest pain.   RESPIRATORY: Denies shortness of breath or dyspnea on exertion.  BREASTS: Denies pain, lumps, or nipple discharge.   ABDOMEN: Denies abdominal pain, constipation, diarrhea, nausea, vomiting, change in appetite.  URINARY: Denies frequency, dysuria, hematuria.  NEUROLOGIC: Denies syncope or weakness.   PSYCHIATRIC: Denies depression, anxiety or mood swings.    Physical Exam:      PHYSICAL EXAM:  BP (!) 162/90   Ht 5' 2" (1.575 m)   Wt 65.2 kg (143 lb 10.1 oz)   BMI 26.27 kg/m²   Body mass index is 26.27 kg/m².     APPEARANCE: Well nourished, well developed, in no acute distress.  PSYCH: Appropriate mood and affect.  SKIN: No acne or hirsutism.  NECK: Neck symmetric without masses or thyromegaly.  NODES: No inguinal, axillary, or supraclavicular lymph node enlargement.  CHEST: Normal respiratory effort.    CARDIOVASCULAR:  Regular rate and rhythm.  LUNGS:  Clear to auscultation bilaterally.  BREASTS: Symmetrical, no skin " changes or visible lesions.  No palpable masses or nipple discharge bilaterally.  ABDOMEN: Soft.  No tenderness or masses.    PELVIC: Normal external genitalia without lesions.  Normal hair distribution.  Adequate perineal body, normal urethral meatus.  Vagina atoprhic.  Some clumpy white discharge present.  No lesions.  Cervix and uterus surgically absent.  Adnexa without masses or tenderness.    EXTREMITIES: No edema.  No tenderness to palpation.    Labs:  Urine dip negative for blood    Assessment/Plan:     54 y.o.  here with vaginal atrophy    Breast cancer screening  -     Mammo Digital Screening Bilat w/ Anup; Future; Expected date: 2019    Post-menopausal bleeding  -     POCT urine dipstick without microscope  -     Urine culture    Yeast infection  -     Vaginosis Screen by DNA Probe    Vaginal atrophy    Other orders  -     fluconazole (DIFLUCAN) 150 MG Tab; Take 1 tablet (150 mg total) by mouth once. Take one tablet. If symptoms persist, repeat dose in 3 days. for 1 dose  Dispense: 2 tablet; Refill: 1          Counselin.  Postcoital bleeding:  Will treat yeast infection.  Atrophy present on exam.  Discussed Replens and lubrication.  Also discussed vaginal estrogen if no improvement.  R/B/A reviewed and all questions answered.  Patient to call with any issues or concerns.  2.  Follow up with PCP for other health maintenance.  3.  RTC in 6 weeks for follow up or sooner if needed.    Use of the [facility] Patient Portal discussed and encouraged during today's visit.

## 2019-02-05 LAB
CANDIDA RRNA VAG QL PROBE: NEGATIVE
G VAGINALIS RRNA GENITAL QL PROBE: NEGATIVE
T VAGINALIS RRNA GENITAL QL PROBE: NEGATIVE

## 2019-02-06 ENCOUNTER — TELEPHONE (OUTPATIENT)
Dept: OBSTETRICS AND GYNECOLOGY | Facility: CLINIC | Age: 54
End: 2019-02-06

## 2019-02-06 LAB — BACTERIA UR CULT: NORMAL

## 2019-02-06 NOTE — TELEPHONE ENCOUNTER
----- Message from Safia Monroy sent at 2/6/2019 10:40 AM CST -----  Contact: Patient   Patient called regarding a prescription she was expecting but hasn't been called in by provider yet. The patient would like a call back to discuss rather she should expect this prescription or another. The patient can be reached at [phone].

## 2019-02-07 ENCOUNTER — TELEPHONE (OUTPATIENT)
Dept: OBSTETRICS AND GYNECOLOGY | Facility: CLINIC | Age: 54
End: 2019-02-07

## 2019-02-07 NOTE — TELEPHONE ENCOUNTER
----- Message from Basim Hughes MA sent at 2/6/2019  2:40 PM CST -----  Contact: Patient   Patient wanted to know about rx for yeast  ----- Message -----  From: Safia Monroy  Sent: 2/6/2019  10:40 AM  To: Babatunde Rhodes Staff    Patient called regarding a prescription she was expecting but hasn't been called in by provider yet. The patient would like a call back to discuss rather she should expect this prescription or another. The patient can be reached at [phone].

## 2019-03-18 ENCOUNTER — APPOINTMENT (OUTPATIENT)
Dept: RADIOLOGY | Facility: OTHER | Age: 54
End: 2019-03-18
Attending: STUDENT IN AN ORGANIZED HEALTH CARE EDUCATION/TRAINING PROGRAM
Payer: COMMERCIAL

## 2019-03-18 ENCOUNTER — OFFICE VISIT (OUTPATIENT)
Dept: OBSTETRICS AND GYNECOLOGY | Facility: CLINIC | Age: 54
End: 2019-03-18
Attending: STUDENT IN AN ORGANIZED HEALTH CARE EDUCATION/TRAINING PROGRAM
Payer: COMMERCIAL

## 2019-03-18 VITALS
HEIGHT: 62 IN | DIASTOLIC BLOOD PRESSURE: 80 MMHG | WEIGHT: 143 LBS | WEIGHT: 143.31 LBS | HEIGHT: 62 IN | SYSTOLIC BLOOD PRESSURE: 118 MMHG | BODY MASS INDEX: 26.37 KG/M2 | BODY MASS INDEX: 26.31 KG/M2

## 2019-03-18 DIAGNOSIS — N89.8 VAGINAL DISCHARGE: ICD-10-CM

## 2019-03-18 DIAGNOSIS — N95.2 VAGINAL ATROPHY: Primary | ICD-10-CM

## 2019-03-18 DIAGNOSIS — Z12.39 BREAST CANCER SCREENING: ICD-10-CM

## 2019-03-18 PROCEDURE — 99999 PR PBB SHADOW E&M-EST. PATIENT-LVL III: CPT | Mod: PBBFAC,,, | Performed by: STUDENT IN AN ORGANIZED HEALTH CARE EDUCATION/TRAINING PROGRAM

## 2019-03-18 PROCEDURE — 99213 OFFICE O/P EST LOW 20 MIN: CPT | Mod: S$GLB,,, | Performed by: STUDENT IN AN ORGANIZED HEALTH CARE EDUCATION/TRAINING PROGRAM

## 2019-03-18 PROCEDURE — 77063 MAMMO DIGITAL SCREENING BILAT WITH TOMOSYNTHESIS_CAD: ICD-10-PCS | Mod: 26,,, | Performed by: RADIOLOGY

## 2019-03-18 PROCEDURE — 77067 SCR MAMMO BI INCL CAD: CPT | Mod: TC,PN

## 2019-03-18 PROCEDURE — 99999 PR PBB SHADOW E&M-EST. PATIENT-LVL III: ICD-10-PCS | Mod: PBBFAC,,, | Performed by: STUDENT IN AN ORGANIZED HEALTH CARE EDUCATION/TRAINING PROGRAM

## 2019-03-18 PROCEDURE — 77063 BREAST TOMOSYNTHESIS BI: CPT | Mod: 26,,, | Performed by: RADIOLOGY

## 2019-03-18 PROCEDURE — 77067 MAMMO DIGITAL SCREENING BILAT WITH TOMOSYNTHESIS_CAD: ICD-10-PCS | Mod: 26,,, | Performed by: RADIOLOGY

## 2019-03-18 PROCEDURE — 99213 PR OFFICE/OUTPT VISIT, EST, LEVL III, 20-29 MIN: ICD-10-PCS | Mod: S$GLB,,, | Performed by: STUDENT IN AN ORGANIZED HEALTH CARE EDUCATION/TRAINING PROGRAM

## 2019-03-18 PROCEDURE — 77067 SCR MAMMO BI INCL CAD: CPT | Mod: 26,,, | Performed by: RADIOLOGY

## 2019-03-18 RX ORDER — FLUCONAZOLE 150 MG/1
150 TABLET ORAL ONCE
Qty: 2 TABLET | Refills: 1 | Status: SHIPPED | OUTPATIENT
Start: 2019-03-18 | End: 2019-03-18

## 2019-03-18 RX ORDER — MELOXICAM 7.5 MG/1
TABLET ORAL
COMMUNITY
Start: 2018-09-20 | End: 2019-03-25

## 2019-03-18 NOTE — PROGRESS NOTES
Chief Complaint: Vaginal atrophy     HPI:      Anusha Carranza is a 54 y.o.  who presents today for follow up of vaginal atrophy.  Reports improvement in vaginal dryness with estrogen cream.  Has noticed some irritation for the past few days.  No other issues or concerns.  No vaginal bleeding.    OB History      Para Term  AB Living    4 4 4     4    SAB TAB Ectopic Multiple Live Births            4        Obstetric Comments    Menarche at 12.  BB 6# 14 oz        Past Medical History:   Diagnosis Date    Hypertension      Past Surgical History:   Procedure Laterality Date    CHOLECYSTECTOMY      EYE SURGERY Bilateral 2009    HYSTERECTOMY  2002    [facility]   ([facility]      Social History     Socioeconomic History    Marital status:      Spouse name: Not on file    Number of children: Not on file    Years of education: Not on file    Highest education level: Not on file   Social Needs    Financial resource strain: Not on file    Food insecurity - worry: Not on file    Food insecurity - inability: Not on file    Transportation needs - medical: Not on file    Transportation needs - non-medical: Not on file   Occupational History    Not on file   Tobacco Use    Smoking status: Never Smoker    Smokeless tobacco: Never Used   Substance and Sexual Activity    Alcohol use: Yes     Frequency: Monthly or less     Comment: Social     Drug use: No    Sexual activity: Yes     Partners: Male     Birth control/protection: Surgical     Comment: /Engaged:     ([facility]  )   Other Topics Concern    Not on file   Social History Narrative    Not on file     Family History   Problem Relation Age of Onset    Heart disease Mother     Diabetes Mother     Stroke Neg Hx     Breast cancer Neg Hx     Colon cancer Neg Hx     Ovarian cancer Neg Hx        Current Outpatient Medications:     conjugated estrogens (PREMARIN) vaginal cream, Apply pea sized amount vaginally nightly x 2 weeks,  "then every other night x 2 weeks, then two times per week x 2 weeks, then weekly., Disp: 30 g, Rfl: 2    levothyroxine (SYNTHROID) 50 MCG tablet, Take 50 mcg by mouth once daily., Disp: , Rfl: 1    losartan (COZAAR) 50 MG tablet, Take 1 tablet (50 mg total) by mouth once daily., Disp: 90 tablet, Rfl: 3    meloxicam (MOBIC) 7.5 MG tablet, , Disp: , Rfl:     ROS:     GENERAL: Denies unintentional weight gain or weight loss. Feeling well overall.   SKIN: Denies rash or lesions.   HEENT: Denies headaches, or vision changes.   CARDIOVASCULAR: Denies palpitations or chest pain.   RESPIRATORY: Denies shortness of breath or dyspnea on exertion.  BREASTS: Denies pain, lumps, or nipple discharge.   ABDOMEN: Denies abdominal pain, constipation, diarrhea, nausea, vomiting, change in appetite.  URINARY: Denies frequency, dysuria, hematuria.  NEUROLOGIC: Denies syncope or weakness.   PSYCHIATRIC: Denies depression, anxiety or mood swings.    Physical Exam:      PHYSICAL EXAM:  /80   Ht 5' 2" (1.575 m)   Wt 65 kg (143 lb 4.8 oz)   LMP  (LMP Unknown) Comment: [facility]     BMI 26.21 kg/m²   Body mass index is 26.21 kg/m².     APPEARANCE: Well nourished, well developed, in no acute distress.  PSYCH: Appropriate mood and affect.  SKIN: No acne or hirsutism.  ABDOMEN: Soft.  No tenderness or masses.    PELVIC: Normal external genitalia without lesions.  Normal hair distribution.  Adequate perineal body, normal urethral meatus.  Vagina moist and well rugated without lesion.  Clumpy white vaginal discharge.  Cervix and uterus surgically absent.  Adnexa without masses or tenderness.    EXTREMITIES: No edema.  No tenderness to palpation.    Labs:  Wet prep + yeast    Assessment/Plan:     54 y.o.     Vaginal atrophy    Vaginal discharge    Other orders  -     fluconazole (DIFLUCAN) 150 MG Tab; Take 1 tablet (150 mg total) by mouth once. Take one tablet. If symptoms persist, repeat dose in 3 days. for 1 dose  Dispense: 2 " tablet; Refill: 1          Counselin. Vaginal atrophy:  Improved.  Continue vaginal estrogen and lubrication.    2.  Vaginal candidiasis:  Diflucan to pharmacy.  Reviewed perineal and vulvar care.  3.  Needs MMG follow up.   4.  Follow up with PCP for other health maintenance.  5.  RTC in 6 months or sooner if needed.    Use of the [facility] Patient Portal discussed and encouraged during today's visit.

## 2019-03-21 ENCOUNTER — TELEPHONE (OUTPATIENT)
Dept: OBSTETRICS AND GYNECOLOGY | Facility: CLINIC | Age: 54
End: 2019-03-21

## 2019-03-21 DIAGNOSIS — R92.8 ABNORMAL MAMMOGRAM: Primary | ICD-10-CM

## 2019-03-21 NOTE — TELEPHONE ENCOUNTER
----- Message from Марина Farr MD sent at 3/21/2019  6:39 AM CDT -----  Please touch base with Radiology and see what the progress is on her records.  She had an incomplete mammogram.  Did this ever get resolved?    Thanks!

## 2019-03-21 NOTE — TELEPHONE ENCOUNTER
Spoke with patient and discussed MMG results.  She has not heard anything yet regarding results.  Discussed will need records and possible additional imaging which breast center will contact her regarding how to do all of that.  She will call if she doesn't hear anything.  All questions answered.

## 2019-03-21 NOTE — TELEPHONE ENCOUNTER
Spoke with Lamberto at the [facility], patient has been contacted, I'm faxing over 2017 mammogram for comparison and also dx mmg and u/s for further testing.

## 2019-03-25 ENCOUNTER — OFFICE VISIT (OUTPATIENT)
Dept: FAMILY MEDICINE | Facility: CLINIC | Age: 54
End: 2019-03-25
Payer: COMMERCIAL

## 2019-03-25 ENCOUNTER — LAB VISIT (OUTPATIENT)
Dept: LAB | Facility: HOSPITAL | Age: 54
End: 2019-03-25
Attending: FAMILY MEDICINE
Payer: COMMERCIAL

## 2019-03-25 VITALS
BODY MASS INDEX: 26.41 KG/M2 | HEART RATE: 96 BPM | OXYGEN SATURATION: 96 % | SYSTOLIC BLOOD PRESSURE: 138 MMHG | HEIGHT: 62 IN | RESPIRATION RATE: 16 BRPM | TEMPERATURE: 98 F | DIASTOLIC BLOOD PRESSURE: 80 MMHG | WEIGHT: 143.5 LBS

## 2019-03-25 DIAGNOSIS — M72.2 PLANTAR FASCIITIS, LEFT: ICD-10-CM

## 2019-03-25 DIAGNOSIS — E03.9 HYPOTHYROIDISM, UNSPECIFIED TYPE: ICD-10-CM

## 2019-03-25 DIAGNOSIS — I10 ESSENTIAL HYPERTENSION, BENIGN: ICD-10-CM

## 2019-03-25 DIAGNOSIS — I10 ESSENTIAL HYPERTENSION, BENIGN: Primary | ICD-10-CM

## 2019-03-25 LAB
ALBUMIN SERPL BCP-MCNC: 4 G/DL (ref 3.5–5.2)
ALP SERPL-CCNC: 114 U/L (ref 55–135)
ALT SERPL W/O P-5'-P-CCNC: 24 U/L (ref 10–44)
ANION GAP SERPL CALC-SCNC: 6 MMOL/L (ref 8–16)
AST SERPL-CCNC: 28 U/L (ref 10–40)
BILIRUB SERPL-MCNC: 0.2 MG/DL (ref 0.1–1)
BUN SERPL-MCNC: 15 MG/DL (ref 6–20)
CALCIUM SERPL-MCNC: 9.9 MG/DL (ref 8.7–10.5)
CHLORIDE SERPL-SCNC: 104 MMOL/L (ref 95–110)
CHOLEST SERPL-MCNC: 200 MG/DL (ref 120–199)
CHOLEST/HDLC SERPL: 3.8 {RATIO} (ref 2–5)
CO2 SERPL-SCNC: 30 MMOL/L (ref 23–29)
CREAT SERPL-MCNC: 0.8 MG/DL (ref 0.5–1.4)
EST. GFR  (AFRICAN AMERICAN): >60 ML/MIN/1.73 M^2
EST. GFR  (NON AFRICAN AMERICAN): >60 ML/MIN/1.73 M^2
ESTIMATED AVG GLUCOSE: 111 MG/DL (ref 68–131)
GLUCOSE SERPL-MCNC: 91 MG/DL (ref 70–110)
HBA1C MFR BLD HPLC: 5.5 % (ref 4–5.6)
HDLC SERPL-MCNC: 53 MG/DL (ref 40–75)
HDLC SERPL: 26.5 % (ref 20–50)
LDLC SERPL CALC-MCNC: 90.2 MG/DL (ref 63–159)
NONHDLC SERPL-MCNC: 147 MG/DL
POTASSIUM SERPL-SCNC: 4.4 MMOL/L (ref 3.5–5.1)
PROT SERPL-MCNC: 7.7 G/DL (ref 6–8.4)
SODIUM SERPL-SCNC: 140 MMOL/L (ref 136–145)
T4 FREE SERPL-MCNC: 1.07 NG/DL (ref 0.71–1.51)
TRIGL SERPL-MCNC: 284 MG/DL (ref 30–150)
TSH SERPL DL<=0.005 MIU/L-ACNC: 1.56 UIU/ML (ref 0.4–4)

## 2019-03-25 PROCEDURE — 99214 OFFICE O/P EST MOD 30 MIN: CPT | Mod: S$GLB,,, | Performed by: FAMILY MEDICINE

## 2019-03-25 PROCEDURE — 99999 PR PBB SHADOW E&M-EST. PATIENT-LVL III: CPT | Mod: PBBFAC,,, | Performed by: FAMILY MEDICINE

## 2019-03-25 PROCEDURE — 83036 HEMOGLOBIN GLYCOSYLATED A1C: CPT

## 2019-03-25 PROCEDURE — 99999 PR PBB SHADOW E&M-EST. PATIENT-LVL III: ICD-10-PCS | Mod: PBBFAC,,, | Performed by: FAMILY MEDICINE

## 2019-03-25 PROCEDURE — 99214 PR OFFICE/OUTPT VISIT, EST, LEVL IV, 30-39 MIN: ICD-10-PCS | Mod: S$GLB,,, | Performed by: FAMILY MEDICINE

## 2019-03-25 PROCEDURE — 80053 COMPREHEN METABOLIC PANEL: CPT

## 2019-03-25 PROCEDURE — 84439 ASSAY OF FREE THYROXINE: CPT

## 2019-03-25 PROCEDURE — 84443 ASSAY THYROID STIM HORMONE: CPT

## 2019-03-25 PROCEDURE — 36415 COLL VENOUS BLD VENIPUNCTURE: CPT | Mod: PO

## 2019-03-25 PROCEDURE — 80061 LIPID PANEL: CPT

## 2019-03-25 RX ORDER — IRBESARTAN 150 MG/1
150 TABLET ORAL DAILY
Qty: 90 TABLET | Refills: 3 | Status: SHIPPED | OUTPATIENT
Start: 2019-03-25 | End: 2019-09-18

## 2019-03-25 RX ORDER — LEVOTHYROXINE SODIUM 50 UG/1
50 TABLET ORAL DAILY
Qty: 90 TABLET | Refills: 3 | Status: SHIPPED | OUTPATIENT
Start: 2019-03-25 | End: 2019-09-18 | Stop reason: SDUPTHER

## 2019-03-25 NOTE — PROGRESS NOTES
[facility]  Progress Note    SUBJECTIVE:     Chief Complaint   Patient presents with    Medication Management     htn and thyroids     Foot Pain     past month left heel       HPI   Anusha Carranza  is a 54 y.o. female here for c/o left heel pain for the past month. Onset was sudden, rates pain as moderate, mostly in the AM. Does get better as the day goes on. Somewhat better since it initially started. No falls/trauma. Also here for follow-up of her HTN/thyroid. Patient has no other new complaints/problems at this time.      Review of patient's allergies indicates:   Allergen Reactions    Benadryl [diphenhydramine hcl] Other (See Comments)     Increases Heart Rate     Pcn [penicillins] Swelling       Past Medical History:   Diagnosis Date    Hypertension      Past Surgical History:   Procedure Laterality Date    CHOLECYSTECTOMY      EYE SURGERY Bilateral 2009    HYSTERECTOMY  2002    [facility]   ([facility])      Family History   Problem Relation Age of Onset    Heart disease Mother     Diabetes Mother     Stroke Neg Hx     Breast cancer Neg Hx     Colon cancer Neg Hx     Ovarian cancer Neg Hx      Social History     Tobacco Use    Smoking status: Never Smoker    Smokeless tobacco: Never Used   Substance Use Topics    Alcohol use: Yes     Frequency: Monthly or less     Comment: Social     Drug use: No        Review of Systems   Constitutional: Negative for chills, fever and malaise/fatigue.   HENT: Negative.    Respiratory: Negative.  Negative for cough and shortness of breath.    Cardiovascular: Negative.  Negative for chest pain.   Gastrointestinal: Negative.  Negative for abdominal pain, nausea and vomiting.   Genitourinary: Negative.    Musculoskeletal: Positive for joint pain (L heel pain). Negative for falls.   Neurological: Negative for weakness and headaches.   All other systems reviewed and are negative.    OBJECTIVE:     Vitals:    03/25/19 1416   BP: 138/80   Pulse: 96   Resp: 16    Temp: 97.8 °F (36.6 °C)     Body mass index is 26.25 kg/m².    Physical Exam   Constitutional: She is oriented to person, place, and time and well-developed, well-nourished, and in no distress. No distress.   HENT:   Head: Normocephalic and atraumatic.   Nose: Nose normal.   Eyes: Conjunctivae and EOM are normal.   Neck: No thyromegaly present.   Cardiovascular: Normal rate, regular rhythm and normal heart sounds. Exam reveals no gallop and no friction rub.   No murmur heard.  Pulmonary/Chest: Effort normal and breath sounds normal. No respiratory distress. She has no wheezes. She has no rales. She exhibits no tenderness.   Abdominal: Soft. Bowel sounds are normal. She exhibits no distension. There is no tenderness. There is no rebound.   Musculoskeletal: She exhibits tenderness (to palpation of L heel).   Neurological: She is alert and oriented to person, place, and time.   Skin: Skin is warm. She is not diaphoretic.       Old records were reviewed. Labs and/or images were independently reviewed.    ASSESSMENT     1. Essential hypertension, benign    2. Hypothyroidism, unspecified type    3. Plantar fasciitis, left        PLAN:     Essential hypertension, benign  -     irbesartan (AVAPRO) 150 MG tablet; Take 1 tablet (150 mg total) by mouth once daily.  Dispense: 90 tablet; Refill: 3  -     Lipid panel; Future  -     Hemoglobin A1c; Future  -     Comprehensive metabolic panel; Future  -     Switch losartan to irbesartan.  -     Educated patient to take medications as prescribed, and to record bp logs daily to bring along to next visit. Instructed patient to decrease salt intake. Also told patient to call if any new signs or symptoms develop.    Hypothyroidism, unspecified type  -     TSH; Future  -     T4, free; Future  -     levothyroxine (SYNTHROID) 50 MCG tablet; Take 1 tablet (50 mcg total) by mouth once daily.  Dispense: 90 tablet; Refill: 3  -     Stable. Continue current regimen.    Plantar fasciitis,  left   -     Recommend ICE, stretches. Also recommend comfortable inserts.     RTC PRN    Bobby Allen MD  03/25/2019 2:33 PM

## 2019-04-04 ENCOUNTER — TELEPHONE (OUTPATIENT)
Dept: OBSTETRICS AND GYNECOLOGY | Facility: CLINIC | Age: 54
End: 2019-04-04

## 2019-04-04 NOTE — TELEPHONE ENCOUNTER
----- Message from Марина Farr MD sent at 4/4/2019  5:13 PM CDT -----  Please call patient:  Mammogram is normal which is great news!  They were able to compare her mammogram from this year with a previous mammogram and everything looks the same.  We recommend repeat mammogram in 1 year.  Please let me know if she has any questions or concerns.  Thank you!

## 2019-05-07 ENCOUNTER — OFFICE VISIT (OUTPATIENT)
Dept: URGENT CARE | Facility: CLINIC | Age: 54
End: 2019-05-07
Payer: COMMERCIAL

## 2019-05-07 VITALS
WEIGHT: 143 LBS | HEART RATE: 68 BPM | DIASTOLIC BLOOD PRESSURE: 70 MMHG | TEMPERATURE: 98 F | HEIGHT: 62 IN | SYSTOLIC BLOOD PRESSURE: 140 MMHG | BODY MASS INDEX: 26.31 KG/M2 | OXYGEN SATURATION: 98 %

## 2019-05-07 DIAGNOSIS — R05.9 COUGH: ICD-10-CM

## 2019-05-07 DIAGNOSIS — R09.82 POST-NASAL DRIP: ICD-10-CM

## 2019-05-07 DIAGNOSIS — Z20.828 EXPOSURE TO THE FLU: Primary | ICD-10-CM

## 2019-05-07 PROCEDURE — 99214 PR OFFICE/OUTPT VISIT, EST, LEVL IV, 30-39 MIN: ICD-10-PCS | Mod: S$GLB,,, | Performed by: PHYSICIAN ASSISTANT

## 2019-05-07 PROCEDURE — 99214 OFFICE O/P EST MOD 30 MIN: CPT | Mod: S$GLB,,, | Performed by: PHYSICIAN ASSISTANT

## 2019-05-07 RX ORDER — ONDANSETRON 4 MG/1
4 TABLET, FILM COATED ORAL EVERY 8 HOURS PRN
Qty: 15 TABLET | Refills: 0 | Status: SHIPPED | OUTPATIENT
Start: 2019-05-07 | End: 2019-05-12

## 2019-05-07 RX ORDER — OSELTAMIVIR PHOSPHATE 75 MG/1
75 CAPSULE ORAL DAILY
Qty: 10 CAPSULE | Refills: 0 | Status: SHIPPED | OUTPATIENT
Start: 2019-05-07 | End: 2019-05-17

## 2019-05-07 RX ORDER — BENZONATATE 100 MG/1
100 CAPSULE ORAL EVERY 6 HOURS PRN
Qty: 30 CAPSULE | Refills: 0 | Status: SHIPPED | OUTPATIENT
Start: 2019-05-07 | End: 2019-05-14

## 2019-05-07 NOTE — PROGRESS NOTES
"Subjective:       Patient ID: Anusha Carranza is a 54 y.o. female.    Vitals:  height is 5' 2" (1.575 m) and weight is 64.9 kg (143 lb). Her temperature is 98.1 °F (36.7 °C). Her blood pressure is 140/70 (abnormal) and her pulse is 68. Her oxygen saturation is 98%.     Chief Complaint: Sinus Problem    Pt reports spouse has the flu and is requesting rx for preventive, she also has a post nasal drip     Sinus Problem   This is a new problem. The current episode started in the past 7 days. The problem has been gradually worsening since onset. There has been no fever. Associated symptoms include congestion and coughing. Pertinent negatives include no chills, headaches, shortness of breath or sore throat. Past treatments include nothing.       Constitution: Negative for chills, fatigue and fever.   HENT: Positive for congestion and postnasal drip. Negative for sore throat.    Neck: Negative for painful lymph nodes.   Cardiovascular: Negative for chest pain and leg swelling.   Eyes: Negative for double vision and blurred vision.   Respiratory: Positive for cough. Negative for shortness of breath.    Gastrointestinal: Negative for nausea, vomiting and diarrhea.   Genitourinary: Negative for dysuria, frequency, urgency and history of kidney stones.   Musculoskeletal: Negative for joint pain, joint swelling, muscle cramps and muscle ache.   Skin: Negative for color change, pale, rash and bruising.   Allergic/Immunologic: Negative for seasonal allergies.   Neurological: Negative for dizziness, history of vertigo, light-headedness, passing out and headaches.   Hematologic/Lymphatic: Negative for swollen lymph nodes.   Psychiatric/Behavioral: Negative for nervous/anxious, sleep disturbance and depression. The patient is not nervous/anxious.        Objective:      Physical Exam   Constitutional: She is oriented to person, place, and time. She appears well-developed and well-nourished. She is cooperative.  Non-toxic appearance. " She does not have a sickly appearance. She does not appear ill. No distress.   HENT:   Head: Normocephalic and atraumatic.   Right Ear: Hearing, external ear and ear canal normal. A middle ear effusion is present.   Left Ear: Hearing, external ear and ear canal normal. A middle ear effusion is present.   Nose: Mucosal edema present. No rhinorrhea or nasal deformity. No epistaxis. Right sinus exhibits no maxillary sinus tenderness and no frontal sinus tenderness. Left sinus exhibits no maxillary sinus tenderness and no frontal sinus tenderness.   Mouth/Throat: Uvula is midline and mucous membranes are normal. No trismus in the jaw. Normal dentition. No uvula swelling. Posterior oropharyngeal erythema present. No tonsillar exudate.   Post nasal drainage    Eyes: Pupils are equal, round, and reactive to light. Conjunctivae and lids are normal. No scleral icterus.   Sclera clear bilat   Neck: Trachea normal, full passive range of motion without pain and phonation normal. Neck supple.   Cardiovascular: Normal rate, regular rhythm, normal heart sounds, intact distal pulses and normal pulses.   Pulmonary/Chest: Effort normal and breath sounds normal. No respiratory distress. She has no wheezes.   Abdominal: Soft. Normal appearance and bowel sounds are normal. She exhibits no distension. There is no tenderness.   Musculoskeletal: Normal range of motion. She exhibits no edema or deformity.   Lymphadenopathy:     She has no cervical adenopathy.   Neurological: She is alert and oriented to person, place, and time. She exhibits normal muscle tone. Coordination normal.   Skin: Skin is warm, dry and intact. She is not diaphoretic. No pallor.   Psychiatric: She has a normal mood and affect. Her speech is normal and behavior is normal. Judgment and thought content normal. Cognition and memory are normal.   Nursing note and vitals reviewed.      Assessment:       1. Exposure to the flu    2. Post-nasal drip    3. Cough        Plan:          Exposure to the flu  -     oseltamivir (TAMIFLU) 75 MG capsule; Take 1 capsule (75 mg total) by mouth once daily. for 10 days  Dispense: 10 capsule; Refill: 0  -     ondansetron (ZOFRAN) 4 MG tablet; Take 1 tablet (4 mg total) by mouth every 8 (eight) hours as needed.  Dispense: 15 tablet; Refill: 0    Post-nasal drip    Cough  -     benzonatate (TESSALON PERLES) 100 MG capsule; Take 1 capsule (100 mg total) by mouth every 6 (six) hours as needed.  Dispense: 30 capsule; Refill: 0    Continue Claritin for drainage

## 2019-05-07 NOTE — PATIENT INSTRUCTIONS
FLU Exposure  You were exposed to sick contacts and prescribed prophylaxis treatment against Influenza.  Please take meds as prescribed.  Continue symptomatic care at home.    Rest and fluids are important.   Follow up with your PCP in the next 2-3 days if no improvement.  Follow up in the ER for any worsening of symptoms.

## 2019-05-11 ENCOUNTER — TELEPHONE (OUTPATIENT)
Dept: URGENT CARE | Facility: CLINIC | Age: 54
End: 2019-05-11

## 2019-05-11 NOTE — TELEPHONE ENCOUNTER
Called patient to see how she is feeling since her visit on 5/7/19. Patient was not available so left a voice ma=shireen asking her to please call the clinic at her [facility]

## 2019-06-06 ENCOUNTER — OFFICE VISIT (OUTPATIENT)
Dept: URGENT CARE | Facility: CLINIC | Age: 54
End: 2019-06-06
Payer: COMMERCIAL

## 2019-06-06 VITALS
SYSTOLIC BLOOD PRESSURE: 144 MMHG | OXYGEN SATURATION: 98 % | WEIGHT: 143 LBS | BODY MASS INDEX: 26.31 KG/M2 | DIASTOLIC BLOOD PRESSURE: 85 MMHG | TEMPERATURE: 99 F | HEIGHT: 62 IN | HEART RATE: 86 BPM | RESPIRATION RATE: 18 BRPM

## 2019-06-06 DIAGNOSIS — J02.9 SORE THROAT: ICD-10-CM

## 2019-06-06 DIAGNOSIS — J30.9 ALLERGIC SINUSITIS: Primary | ICD-10-CM

## 2019-06-06 DIAGNOSIS — H60.333 ACUTE SWIMMER'S EAR OF BOTH SIDES: ICD-10-CM

## 2019-06-06 LAB
CTP QC/QA: YES
S PYO RRNA THROAT QL PROBE: NEGATIVE

## 2019-06-06 PROCEDURE — 96372 THER/PROPH/DIAG INJ SC/IM: CPT | Mod: S$GLB,,, | Performed by: EMERGENCY MEDICINE

## 2019-06-06 PROCEDURE — 87880 POCT RAPID STREP A: ICD-10-PCS | Mod: QW,S$GLB,, | Performed by: PHYSICIAN ASSISTANT

## 2019-06-06 PROCEDURE — 87880 STREP A ASSAY W/OPTIC: CPT | Mod: QW,S$GLB,, | Performed by: PHYSICIAN ASSISTANT

## 2019-06-06 PROCEDURE — 99214 OFFICE O/P EST MOD 30 MIN: CPT | Mod: 25,S$GLB,, | Performed by: PHYSICIAN ASSISTANT

## 2019-06-06 PROCEDURE — 96372 PR INJECTION,THERAP/PROPH/DIAG2ST, IM OR SUBCUT: ICD-10-PCS | Mod: S$GLB,,, | Performed by: EMERGENCY MEDICINE

## 2019-06-06 PROCEDURE — 99214 PR OFFICE/OUTPT VISIT, EST, LEVL IV, 30-39 MIN: ICD-10-PCS | Mod: 25,S$GLB,, | Performed by: PHYSICIAN ASSISTANT

## 2019-06-06 RX ORDER — OFLOXACIN 3 MG/ML
5 SOLUTION AURICULAR (OTIC) 2 TIMES DAILY
Qty: 10 ML | Refills: 0 | Status: SHIPPED | OUTPATIENT
Start: 2019-06-06 | End: 2019-06-20

## 2019-06-06 RX ORDER — AZELASTINE 1 MG/ML
1 SPRAY, METERED NASAL 2 TIMES DAILY
Qty: 30 ML | Refills: 0 | Status: SHIPPED | OUTPATIENT
Start: 2019-06-06 | End: 2024-02-15

## 2019-06-06 RX ORDER — LOSARTAN POTASSIUM 50 MG/1
TABLET ORAL
COMMUNITY
End: 2019-09-18 | Stop reason: SDUPTHER

## 2019-06-06 RX ORDER — BETAMETHASONE SODIUM PHOSPHATE AND BETAMETHASONE ACETATE 3; 3 MG/ML; MG/ML
9 INJECTION, SUSPENSION INTRA-ARTICULAR; INTRALESIONAL; INTRAMUSCULAR; SOFT TISSUE
Status: COMPLETED | OUTPATIENT
Start: 2019-06-06 | End: 2019-06-06

## 2019-06-06 RX ORDER — LOSARTAN POTASSIUM 50 MG/1
TABLET ORAL
Refills: 2 | COMMUNITY
Start: 2019-03-27 | End: 2019-09-18 | Stop reason: SDUPTHER

## 2019-06-06 RX ORDER — METHYLPREDNISOLONE 4 MG/1
TABLET ORAL
Refills: 0 | COMMUNITY
Start: 2019-05-22 | End: 2019-06-06 | Stop reason: ALTCHOICE

## 2019-06-06 RX ORDER — CETIRIZINE HYDROCHLORIDE 10 MG/1
10 TABLET ORAL DAILY
Refills: 0 | COMMUNITY
Start: 2019-06-06 | End: 2024-02-15

## 2019-06-06 RX ADMIN — BETAMETHASONE SODIUM PHOSPHATE AND BETAMETHASONE ACETATE 9 MG: 3; 3 INJECTION, SUSPENSION INTRA-ARTICULAR; INTRALESIONAL; INTRAMUSCULAR; SOFT TISSUE at 12:06

## 2019-06-06 NOTE — PATIENT INSTRUCTIONS
If your condition worsens or fails to improve we recommend that you receive another evaluation at the ER immediately or contact your PCP to discuss your concerns or return here. You must understand that you've received an urgent care treatment only and that you may be released before all your medical problems are known or treated. You the patient will arrange for followup care as instructed.   If we discussed that I think your illness is viral, it will not respond to antibiotics and will last 5-7 days.  -  Flonase (fluticasone) is a nasal spray which is available over the counter and may help with your symptoms.   -  Take zyrtec daily to help with allergy symptoms and drainage  -  Use astelin spray as directect  -  Rest and fluids are also important.   -  Tylenol or ibuprofen can also be used as directed for pain unless you have an allergy to them or medical condition such as stomach ulcers, kidney or liver disease or blood thinners etc for which you should not be taking these type of medications.             Allergic Rhinitis  Allergic rhinitis is an allergic reaction that affects the nose, and often the eyes. Its often known as nasal allergies. Nasal allergies are often due to things in the environment that are breathed in. Depending what you are sensitive to, nasal allergies may occur only during certain seasons. Or they may occur year round. Common indoor allergens include house dust mites, mold, cockroaches, and pet dander. Outdoor allergens include pollen from trees, grasses, and weeds.   Symptoms include a drippy, stuffy, and itchy nose. They also include sneezing and red and itchy eyes. You may feel tired more often. Severe allergies may also affect your breathing and trigger a condition called asthma.   Tests can be done to see what allergens are affecting you. You may be referred to an allergy specialist for testing and further evaluation.  Home care  Your healthcare provider may prescribe medicines to  help relieve allergy symptoms. These may include oral medicines, nasal sprays, or eye drops.  Ask your provider for advice on how to avoid substances that you are allergic to. Below are a few tips for each type of allergen.  Pet dander:  · Do not have pets with fur and feathers.  · If you can't avoid having a pet, keep it out of your bedroom and off upholstered furniture.  Pollen:  · When pollen counts are high, keep windows of your car and home closed. If possible, use an air conditioner instead.  · Wear a filter mask when mowing or doing yard work.  House dust mites:  · Wash bedding every week in warm water and detergent and dry on a hot setting.  · Cover the mattress, box spring, and pillows with allergy covers.   · If possible, sleep in a room with no carpet, curtains, or upholstered furniture.  Cockroaches:  · Store food in sealed containers.  · Remove garbage from the home promptly.  · Fix water leaks  Mold:  · Keep humidity low by using a dehumidifier or air conditioner. Keep the dehumidifier and air conditioner clean and free of mold.  · Clean moldy areas with bleach and water.  In general:  · Vacuum once or twice a week. If possible, use a vacuum with a high-efficiency particulate air (HEPA) filter.  · Do not smoke. Avoid cigarette smoke. Cigarette smoke is an irritant that can make symptoms worse.  Follow-up care  Follow up as advised by the healthcare provider or our staff. If you were referred to an allergy specialist, make this appointment promptly.  When to seek medical advice  Call your healthcare provider right away if the following occur:  · Coughing or wheezing  · Fever greater than 100.4°F (38°C)  · Hives (raised red bumps)  · Continuing symptoms, new symptoms, or worsening symptoms  Call 911 right away if you have:  · Trouble breathing  · Severe swelling of the face or severe itching of the eyes or mouth  Date Last Reviewed: 3/1/2017  © 5858-7386 [employer]. [address] [place] [address],  [place] [address]. All rights reserved. This information is not intended as a substitute for professional medical care. Always follow your healthcare professional's instructions.        Self-Care for Sore Throats    Sore throats happen for many reasons, such as colds, allergies, and infections caused by viruses or bacteria. In any case, your throat becomes red and sore. Your goal for self-care is to reduce your discomfort while giving your throat a chance to heal.  Moisten and soothe your throat  Tips include the following:  · Try a sip of water first thing after waking up.  · Keep your throat moist by drinking 6 or more glasses of clear liquids every day.  · Run a cool-air humidifier in your room overnight.  · Avoid cigarette smoke.   · Suck on throat lozenges, cough drops, hard candy, ice chips, or frozen fruit-juice bars. Use the sugar-free versions if your diet or medical condition requires them.  Gargle to ease irritation  Gargling every hour or 2 can ease irritation. Try gargling with 1 of these solutions:  · 1/4 teaspoon of salt in 1/2 cup of warm water  · An over-the-counter anesthetic gargle  Use medicine for more relief  Over-the-counter medicine can reduce sore throat symptoms. Ask your pharmacist if you have questions about which medicine to use:  · Ease pain with anesthetic sprays. Aspirin or an aspirin substitute also helps. Remember, never give aspirin to anyone 18 or younger, or if you are already taking blood thinners.   · For sore throats caused by allergies, try antihistamines to block the allergic reaction.  · Remember: unless a sore throat is caused by a bacterial infection, antibiotics wont help you.  Prevent future sore throats  Prevention tips include the following:  · Stop smoking or reduce contact with secondhand smoke. Smoke irritates the tender throat lining.  · Limit contact with pets and with allergy-causing substances, such as pollen and mold.  · When youre around someone with a sore  throat or cold, wash your hands often to keep viruses or bacteria from spreading.  · Dont strain your vocal cords.  Call your healthcare provider  Contact your healthcare provider if you have:  · A temperature over 101°F (38.3°C)  · White spots on the throat  · Great difficulty swallowing  · Trouble breathing  · A skin rash  · Recent exposure to someone else with strep bacteria  · Severe hoarseness and swollen glands in the neck or jaw   Date Last Reviewed: 8/1/2016 © 2000-2017 [employer]. [address] [place] [address], [place] [address]. All rights reserved. This information is not intended as a substitute for professional medical care. Always follow your healthcare professional's instructions.

## 2019-06-06 NOTE — PROGRESS NOTES
"Subjective:       Patient ID: Anusha Carranza is a 54 y.o. female.    Vitals:  height is 5' 2" (1.575 m) and weight is 64.9 kg (143 lb). Her temperature is 98.5 °F (36.9 °C). Her blood pressure is 144/85 (abnormal) and her pulse is 86. Her respiration is 18 and oxygen saturation is 98%.     Chief Complaint: Sinus Problem    Patient reports PND and sore throat x 3 days. Also reports frequent sneezing and clear runny nose. Denies cough, fever, chills, headache. But does reports bilateral ear irritation. Reports she has been swimming this summer.    Sinus Problem   This is a new problem. The problem has been gradually worsening since onset. Associated symptoms include ear pain, sneezing and a sore throat. Pertinent negatives include no chills, congestion, coughing, diaphoresis, headaches or shortness of breath. Past treatments include oral decongestants. The treatment provided no relief.       Constitution: Negative for chills, sweating, fatigue and fever.   HENT: Positive for ear pain, postnasal drip and sore throat. Negative for ear discharge, foreign body in ear, tinnitus, hearing loss, congestion, sinus pain, trouble swallowing and voice change.    Neck: Negative for painful lymph nodes.   Cardiovascular: Negative for chest pain.   Eyes: Negative for eye redness.   Respiratory: Negative for chest tightness, cough, sputum production, bloody sputum, COPD, shortness of breath, stridor, wheezing and asthma.    Gastrointestinal: Negative for abdominal pain, nausea and vomiting.   Musculoskeletal: Negative for muscle ache.   Skin: Negative for rash.   Allergic/Immunologic: Positive for sneezing. Negative for seasonal allergies and asthma.   Neurological: Negative for headaches.   Hematologic/Lymphatic: Negative for swollen lymph nodes.       Objective:      Physical Exam   Constitutional: She is oriented to person, place, and time. She appears well-developed and well-nourished. She is cooperative.  Non-toxic appearance. " She does not have a sickly appearance. She does not appear ill. No distress.   HENT:   Head: Normocephalic and atraumatic.   Right Ear: Hearing, tympanic membrane, external ear and ear canal normal. There is swelling and tenderness (tragal). No middle ear effusion.   Left Ear: Hearing, tympanic membrane, external ear and ear canal normal. There is swelling and tenderness (tragal).  No middle ear effusion.   Nose: Mucosal edema and rhinorrhea present. No nasal deformity. No epistaxis. Right sinus exhibits no maxillary sinus tenderness and no frontal sinus tenderness. Left sinus exhibits no maxillary sinus tenderness and no frontal sinus tenderness.   Mouth/Throat: Uvula is midline and mucous membranes are normal. No trismus in the jaw. Normal dentition. No uvula swelling. Posterior oropharyngeal erythema present. No tonsillar exudate.   Cobblestone appearance with drainage   Eyes: Pupils are equal, round, and reactive to light. Conjunctivae and lids are normal. No scleral icterus.   Sclera clear bilat   Neck: Trachea normal, full passive range of motion without pain and phonation normal. Neck supple.   Cardiovascular: Normal rate, regular rhythm, normal heart sounds, intact distal pulses and normal pulses.   Pulmonary/Chest: Effort normal and breath sounds normal. No respiratory distress. She has no wheezes.   Abdominal: Normal appearance.   Musculoskeletal: Normal range of motion. She exhibits no edema or deformity.   Neurological: She is alert and oriented to person, place, and time. She exhibits normal muscle tone. Coordination normal.   Skin: Skin is warm, dry and intact. She is not diaphoretic. No pallor.   Psychiatric: She has a normal mood and affect. Her speech is normal and behavior is normal. Judgment and thought content normal. Cognition and memory are normal.   Nursing note and vitals reviewed.      Results for orders placed or performed in visit on 06/06/19   POCT rapid strep A   Result Value Ref Range     Rapid Strep A Screen Negative Negative     Acceptable Yes      Sore throat likely secondary to allergies/PND. Will start on trial of zyrtec, Astelin. Advised on return precautions. Advised on ER precautions. Answered all patient questions/ Patient voiced understanding and is in agreement with current plan of care.    Assessment:       1. Allergic sinusitis    2. Acute swimmer's ear of both sides    3. Sore throat        Plan:         Allergic sinusitis  -     betamethasone acetate-betamethasone sodium phosphate injection 9 mg  -     azelastine (ASTELIN) 137 mcg (0.1 %) nasal spray; 1 spray (137 mcg total) by Nasal route 2 (two) times daily.  Dispense: 30 mL; Refill: 0  -     cetirizine (ZYRTEC) 10 MG tablet; Take 1 tablet (10 mg total) by mouth once daily.; Refill: 0    Acute swimmer's ear of both sides  -     ofloxacin (FLOXIN) 0.3 % otic solution; Place 5 drops into both ears 2 (two) times daily. for 14 days  Dispense: 10 mL; Refill: 0    Sore throat  -     POCT rapid strep A      Patient Instructions       If your condition worsens or fails to improve we recommend that you receive another evaluation at the ER immediately or contact your PCP to discuss your concerns or return here. You must understand that you've received an urgent care treatment only and that you may be released before all your medical problems are known or treated. You the patient will arrange for followup care as instructed.   If we discussed that I think your illness is viral, it will not respond to antibiotics and will last 5-7 days.  -  Flonase (fluticasone) is a nasal spray which is available over the counter and may help with your symptoms.   -  Take zyrtec daily to help with allergy symptoms and drainage  -  Use astelin spray as directect  -  Rest and fluids are also important.   -  Tylenol or ibuprofen can also be used as directed for pain unless you have an allergy to them or medical condition such as stomach ulcers,  kidney or liver disease or blood thinners etc for which you should not be taking these type of medications.             Allergic Rhinitis  Allergic rhinitis is an allergic reaction that affects the nose, and often the eyes. Its often known as nasal allergies. Nasal allergies are often due to things in the environment that are breathed in. Depending what you are sensitive to, nasal allergies may occur only during certain seasons. Or they may occur year round. Common indoor allergens include house dust mites, mold, cockroaches, and pet dander. Outdoor allergens include pollen from trees, grasses, and weeds.   Symptoms include a drippy, stuffy, and itchy nose. They also include sneezing and red and itchy eyes. You may feel tired more often. Severe allergies may also affect your breathing and trigger a condition called asthma.   Tests can be done to see what allergens are affecting you. You may be referred to an allergy specialist for testing and further evaluation.  Home care  Your healthcare provider may prescribe medicines to help relieve allergy symptoms. These may include oral medicines, nasal sprays, or eye drops.  Ask your provider for advice on how to avoid substances that you are allergic to. Below are a few tips for each type of allergen.  Pet dander:  · Do not have pets with fur and feathers.  · If you can't avoid having a pet, keep it out of your bedroom and off upholstered furniture.  Pollen:  · When pollen counts are high, keep windows of your car and home closed. If possible, use an air conditioner instead.  · Wear a filter mask when mowing or doing yard work.  House dust mites:  · Wash bedding every week in warm water and detergent and dry on a hot setting.  · Cover the mattress, box spring, and pillows with allergy covers.   · If possible, sleep in a room with no carpet, curtains, or upholstered furniture.  Cockroaches:  · Store food in sealed containers.  · Remove garbage from the home  promptly.  · Fix water leaks  Mold:  · Keep humidity low by using a dehumidifier or air conditioner. Keep the dehumidifier and air conditioner clean and free of mold.  · Clean moldy areas with bleach and water.  In general:  · Vacuum once or twice a week. If possible, use a vacuum with a high-efficiency particulate air (HEPA) filter.  · Do not smoke. Avoid cigarette smoke. Cigarette smoke is an irritant that can make symptoms worse.  Follow-up care  Follow up as advised by the healthcare provider or our staff. If you were referred to an allergy specialist, make this appointment promptly.  When to seek medical advice  Call your healthcare provider right away if the following occur:  · Coughing or wheezing  · Fever greater than 100.4°F (38°C)  · Hives (raised red bumps)  · Continuing symptoms, new symptoms, or worsening symptoms  Call 911 right away if you have:  · Trouble breathing  · Severe swelling of the face or severe itching of the eyes or mouth  Date Last Reviewed: 3/1/2017  © 6700-7344 [employer]. [address] [place] [address]. All rights reserved. This information is not intended as a substitute for professional medical care. Always follow your healthcare professional's instructions.        Self-Care for Sore Throats    Sore throats happen for many reasons, such as colds, allergies, and infections caused by viruses or bacteria. In any case, your throat becomes red and sore. Your goal for self-care is to reduce your discomfort while giving your throat a chance to heal.  Moisten and soothe your throat  Tips include the following:  · Try a sip of water first thing after waking up.  · Keep your throat moist by drinking 6 or more glasses of clear liquids every day.  · Run a cool-air humidifier in your room overnight.  · Avoid cigarette smoke.   · Suck on throat lozenges, cough drops, hard candy, ice chips, or frozen fruit-juice bars. Use the sugar-free versions if your diet or medical  condition requires them.  Gargle to ease irritation  Gargling every hour or 2 can ease irritation. Try gargling with 1 of these solutions:  · 1/4 teaspoon of salt in 1/2 cup of warm water  · An over-the-counter anesthetic gargle  Use medicine for more relief  Over-the-counter medicine can reduce sore throat symptoms. Ask your pharmacist if you have questions about which medicine to use:  · Ease pain with anesthetic sprays. Aspirin or an aspirin substitute also helps. Remember, never give aspirin to anyone 18 or younger, or if you are already taking blood thinners.   · For sore throats caused by allergies, try antihistamines to block the allergic reaction.  · Remember: unless a sore throat is caused by a bacterial infection, antibiotics wont help you.  Prevent future sore throats  Prevention tips include the following:  · Stop smoking or reduce contact with secondhand smoke. Smoke irritates the tender throat lining.  · Limit contact with pets and with allergy-causing substances, such as pollen and mold.  · When youre around someone with a sore throat or cold, wash your hands often to keep viruses or bacteria from spreading.  · Dont strain your vocal cords.  Call your healthcare provider  Contact your healthcare provider if you have:  · A temperature over 101°F (38.3°C)  · White spots on the throat  · Great difficulty swallowing  · Trouble breathing  · A skin rash  · Recent exposure to someone else with strep bacteria  · Severe hoarseness and swollen glands in the neck or jaw   Date Last Reviewed: 8/1/2016  © 9467-6242 [employer]. [address] [place] [address], [place] [address]. All rights reserved. This information is not intended as a substitute for professional medical care. Always follow your healthcare professional's instructions.

## 2019-09-18 ENCOUNTER — LAB VISIT (OUTPATIENT)
Dept: LAB | Facility: HOSPITAL | Age: 54
End: 2019-09-18
Attending: FAMILY MEDICINE
Payer: COMMERCIAL

## 2019-09-18 ENCOUNTER — OFFICE VISIT (OUTPATIENT)
Dept: FAMILY MEDICINE | Facility: CLINIC | Age: 54
End: 2019-09-18
Payer: COMMERCIAL

## 2019-09-18 VITALS
WEIGHT: 147.94 LBS | BODY MASS INDEX: 27.22 KG/M2 | HEART RATE: 88 BPM | DIASTOLIC BLOOD PRESSURE: 72 MMHG | OXYGEN SATURATION: 97 % | TEMPERATURE: 98 F | SYSTOLIC BLOOD PRESSURE: 136 MMHG | HEIGHT: 62 IN

## 2019-09-18 DIAGNOSIS — E03.9 HYPOTHYROIDISM, UNSPECIFIED TYPE: ICD-10-CM

## 2019-09-18 DIAGNOSIS — I10 ESSENTIAL HYPERTENSION, BENIGN: Primary | ICD-10-CM

## 2019-09-18 PROCEDURE — 99999 PR PBB SHADOW E&M-EST. PATIENT-LVL III: ICD-10-PCS | Mod: PBBFAC,,, | Performed by: FAMILY MEDICINE

## 2019-09-18 PROCEDURE — 84439 ASSAY OF FREE THYROXINE: CPT

## 2019-09-18 PROCEDURE — 99214 PR OFFICE/OUTPT VISIT, EST, LEVL IV, 30-39 MIN: ICD-10-PCS | Mod: S$GLB,,, | Performed by: FAMILY MEDICINE

## 2019-09-18 PROCEDURE — 36415 COLL VENOUS BLD VENIPUNCTURE: CPT | Mod: PO

## 2019-09-18 PROCEDURE — 80053 COMPREHEN METABOLIC PANEL: CPT

## 2019-09-18 PROCEDURE — 99214 OFFICE O/P EST MOD 30 MIN: CPT | Mod: S$GLB,,, | Performed by: FAMILY MEDICINE

## 2019-09-18 PROCEDURE — 99999 PR PBB SHADOW E&M-EST. PATIENT-LVL III: CPT | Mod: PBBFAC,,, | Performed by: FAMILY MEDICINE

## 2019-09-18 PROCEDURE — 84443 ASSAY THYROID STIM HORMONE: CPT

## 2019-09-18 RX ORDER — LEVOTHYROXINE SODIUM 50 UG/1
50 TABLET ORAL DAILY
Qty: 90 TABLET | Refills: 3 | Status: SHIPPED | OUTPATIENT
Start: 2019-09-18 | End: 2020-10-05 | Stop reason: SDUPTHER

## 2019-09-18 RX ORDER — LOSARTAN POTASSIUM 50 MG/1
50 TABLET ORAL DAILY
Qty: 90 TABLET | Refills: 3 | Status: SHIPPED | OUTPATIENT
Start: 2019-09-18 | End: 2020-10-05 | Stop reason: SDUPTHER

## 2019-09-18 NOTE — PROGRESS NOTES
[facility]  Progress Note    SUBJECTIVE:     Chief Complaint   Patient presents with    Medication Refill       HPI   Anusha Carranza  is a 54 y.o. female here for follow-up of her chronic conditions. Takes meds as diercted. Patient has no other new complaints/problems at this time.      Review of patient's allergies indicates:   Allergen Reactions    Benadryl [diphenhydramine hcl] Other (See Comments)     Increases Heart Rate     Pcn [penicillins] Swelling       Past Medical History:   Diagnosis Date    Hypertension      Past Surgical History:   Procedure Laterality Date    CHOLECYSTECTOMY      EYE SURGERY Bilateral 2009    HYSTERECTOMY  2002    TVH   ([facility])      Family History   Problem Relation Age of Onset    Heart disease Mother     Diabetes Mother     Stroke Neg Hx     Breast cancer Neg Hx     Colon cancer Neg Hx     Ovarian cancer Neg Hx      Social History     Tobacco Use    Smoking status: Never Smoker    Smokeless tobacco: Never Used   Substance Use Topics    Alcohol use: Yes     Frequency: Monthly or less     Comment: Social     Drug use: No        Review of Systems   Constitutional: Negative for chills, fever and malaise/fatigue.   HENT: Negative.    Respiratory: Negative.  Negative for cough and shortness of breath.    Cardiovascular: Negative.  Negative for chest pain.   Gastrointestinal: Negative.  Negative for abdominal pain, nausea and vomiting.   Genitourinary: Negative.    Neurological: Negative for weakness and headaches.   All other systems reviewed and are negative.    OBJECTIVE:     Vitals:    09/18/19 1355   BP: 136/72   Pulse: 88   Temp: 97.9 °F (36.6 °C)     Body mass index is 27.06 kg/m².    Physical Exam   Constitutional: She is oriented to person, place, and time and well-developed, well-nourished, and in no distress. No distress.   HENT:   Head: Normocephalic and atraumatic.   Nose: Nose normal.   Eyes: Conjunctivae and EOM are normal.   Cardiovascular:  Normal rate, regular rhythm and normal heart sounds. Exam reveals no gallop and no friction rub.   No murmur heard.  Pulmonary/Chest: Effort normal and breath sounds normal. No respiratory distress. She has no wheezes. She has no rales. She exhibits no tenderness.   Abdominal: Soft. Bowel sounds are normal. She exhibits no distension. There is no tenderness. There is no rebound.   Neurological: She is alert and oriented to person, place, and time.   Skin: Skin is warm. She is not diaphoretic.       Old records were reviewed. Labs and/or images were independently reviewed.    ASSESSMENT     1. Essential hypertension, benign    2. Hypothyroidism, unspecified type        PLAN:     Essential hypertension, benign  -     losartan (COZAAR) 50 MG tablet; Take 1 tablet (50 mg total) by mouth once daily.  Dispense: 90 tablet; Refill: 3  -     Stable. Continue current regimen.    Hypothyroidism, unspecified type  -     levothyroxine (SYNTHROID) 50 MCG tablet; Take 1 tablet (50 mcg total) by mouth once daily.  Dispense: 90 tablet; Refill: 3  -     TSH; Future  -     T4, free; Future  -     Comprehensive metabolic panel; Future  -     Stable. Continue current regimen.    RTC KYRA Allen MD  09/18/2019 2:10 PM

## 2019-09-19 LAB
ALBUMIN SERPL BCP-MCNC: 3.8 G/DL (ref 3.5–5.2)
ALP SERPL-CCNC: 106 U/L (ref 55–135)
ALT SERPL W/O P-5'-P-CCNC: 21 U/L (ref 10–44)
ANION GAP SERPL CALC-SCNC: 10 MMOL/L (ref 8–16)
AST SERPL-CCNC: 25 U/L (ref 10–40)
BILIRUB SERPL-MCNC: 0.3 MG/DL (ref 0.1–1)
BUN SERPL-MCNC: 12 MG/DL (ref 6–20)
CALCIUM SERPL-MCNC: 9.1 MG/DL (ref 8.7–10.5)
CHLORIDE SERPL-SCNC: 105 MMOL/L (ref 95–110)
CO2 SERPL-SCNC: 25 MMOL/L (ref 23–29)
CREAT SERPL-MCNC: 0.8 MG/DL (ref 0.5–1.4)
EST. GFR  (AFRICAN AMERICAN): >60 ML/MIN/1.73 M^2
EST. GFR  (NON AFRICAN AMERICAN): >60 ML/MIN/1.73 M^2
GLUCOSE SERPL-MCNC: 141 MG/DL (ref 70–110)
POTASSIUM SERPL-SCNC: 3.5 MMOL/L (ref 3.5–5.1)
PROT SERPL-MCNC: 7.3 G/DL (ref 6–8.4)
SODIUM SERPL-SCNC: 140 MMOL/L (ref 136–145)
T4 FREE SERPL-MCNC: 0.94 NG/DL (ref 0.71–1.51)
TSH SERPL DL<=0.005 MIU/L-ACNC: 1.31 UIU/ML (ref 0.4–4)

## 2019-10-24 ENCOUNTER — OFFICE VISIT (OUTPATIENT)
Dept: URGENT CARE | Facility: CLINIC | Age: 54
End: 2019-10-24
Payer: COMMERCIAL

## 2019-10-24 VITALS
HEIGHT: 62 IN | TEMPERATURE: 98 F | BODY MASS INDEX: 27.05 KG/M2 | DIASTOLIC BLOOD PRESSURE: 70 MMHG | SYSTOLIC BLOOD PRESSURE: 127 MMHG | RESPIRATION RATE: 12 BRPM | WEIGHT: 147 LBS | HEART RATE: 79 BPM | OXYGEN SATURATION: 97 %

## 2019-10-24 DIAGNOSIS — J02.9 SORE THROAT: Primary | ICD-10-CM

## 2019-10-24 DIAGNOSIS — J02.9 PHARYNGITIS, UNSPECIFIED ETIOLOGY: ICD-10-CM

## 2019-10-24 LAB
CTP QC/QA: YES
S PYO RRNA THROAT QL PROBE: NEGATIVE

## 2019-10-24 PROCEDURE — 96372 THER/PROPH/DIAG INJ SC/IM: CPT | Mod: S$GLB,,, | Performed by: NURSE PRACTITIONER

## 2019-10-24 PROCEDURE — 99214 PR OFFICE/OUTPT VISIT, EST, LEVL IV, 30-39 MIN: ICD-10-PCS | Mod: 25,S$GLB,, | Performed by: NURSE PRACTITIONER

## 2019-10-24 PROCEDURE — 87880 POCT RAPID STREP A: ICD-10-PCS | Mod: QW,S$GLB,, | Performed by: NURSE PRACTITIONER

## 2019-10-24 PROCEDURE — 96372 PR INJECTION,THERAP/PROPH/DIAG2ST, IM OR SUBCUT: ICD-10-PCS | Mod: S$GLB,,, | Performed by: NURSE PRACTITIONER

## 2019-10-24 PROCEDURE — 87880 STREP A ASSAY W/OPTIC: CPT | Mod: QW,S$GLB,, | Performed by: NURSE PRACTITIONER

## 2019-10-24 PROCEDURE — 99214 OFFICE O/P EST MOD 30 MIN: CPT | Mod: 25,S$GLB,, | Performed by: NURSE PRACTITIONER

## 2019-10-24 RX ORDER — BETAMETHASONE SODIUM PHOSPHATE AND BETAMETHASONE ACETATE 3; 3 MG/ML; MG/ML
6 INJECTION, SUSPENSION INTRA-ARTICULAR; INTRALESIONAL; INTRAMUSCULAR; SOFT TISSUE
Status: COMPLETED | OUTPATIENT
Start: 2019-10-24 | End: 2019-10-24

## 2019-10-24 RX ADMIN — BETAMETHASONE SODIUM PHOSPHATE AND BETAMETHASONE ACETATE 6 MG: 3; 3 INJECTION, SUSPENSION INTRA-ARTICULAR; INTRALESIONAL; INTRAMUSCULAR; SOFT TISSUE at 03:10

## 2019-10-24 NOTE — PATIENT INSTRUCTIONS
General Discharge Instructions   If you were prescribed a narcotic or controlled medication, do not drive or operate heavy equipment or machinery while taking these medications.  If you were prescribed antibiotics, please take them to completion.  You must understand that you've received an Urgent Care treatment only and that you may be released before all your medical problems are known or treated. You, the patient, will arrange for follow up care as instructed.  Follow up with your PCP or specialty clinic as directed in the next 1-2 weeks if not improved or as needed.  You can call [phone] to schedule an appointment with the appropriate provider.  If your condition worsens we recommend that you receive another evaluation at the emergency room immediately or contact your primary medical clinics after hours call service to discuss your concerns.  Please return here or go to the Emergency Department for any concerns or worsening of condition.                                                            Pharyngitis   If your condition worsens or fails to improve we recommend that you receive another evaluation at the ER immediately or contact your PCP to discuss your concerns or return here. You must understand that you've received an urgent care treatment only and that you may be released before all your medical problems are known or treated. You the patient will arrange for followup care as instructed.   Tylenol or ibuprofen for pain may help as long as you are not allergic to these meds or have a medical condition such as stomach ulcers, liver or kidney disease or taking blood thinners etc that would prevent you from using these medications.   Rest and fluids will help as well.   If you were prescribed antibiotics and are female and on BCP use additional methods to prevent pregnancy while on the antibiotics and for one cycle after

## 2019-10-24 NOTE — PROGRESS NOTES
"Subjective:       Patient ID: Anusha Carranza is a 54 y.o. female.    Vitals:  height is 5' 2" (1.575 m) and weight is 66.7 kg (147 lb). Her temperature is 97.9 °F (36.6 °C). Her blood pressure is 127/70 and her pulse is 79. Her respiration is 12 and oxygen saturation is 97%.     Chief Complaint: Sore Throat    Pt. States sore throat and runny nose since this morning. Has not taken anything. No congestion.     Sore Throat    This is a new problem. The current episode started today. The problem has been gradually worsening. Neither side of throat is experiencing more pain than the other. Associated symptoms include a hoarse voice. Pertinent negatives include no congestion, coughing, diarrhea, headaches, shortness of breath or vomiting. She has tried nothing for the symptoms.       Constitution: Negative for chills, fatigue and fever.   HENT: Positive for postnasal drip and sore throat. Negative for congestion.    Neck: Negative for painful lymph nodes.   Cardiovascular: Negative for chest pain and leg swelling.   Eyes: Negative for double vision and blurred vision.   Respiratory: Negative for cough and shortness of breath.    Gastrointestinal: Negative for nausea, vomiting and diarrhea.   Genitourinary: Negative for dysuria, frequency, urgency and history of kidney stones.   Musculoskeletal: Negative for joint pain, joint swelling, muscle cramps and muscle ache.   Skin: Negative for color change, pale, rash and bruising.   Allergic/Immunologic: Positive for sneezing. Negative for seasonal allergies.   Neurological: Negative for dizziness, history of vertigo, light-headedness, passing out and headaches.   Hematologic/Lymphatic: Negative for swollen lymph nodes.   Psychiatric/Behavioral: Negative for nervous/anxious, sleep disturbance and depression. The patient is not nervous/anxious.        Objective:      Physical Exam   Constitutional: She is oriented to person, place, and time. Vital signs are normal. She appears " well-developed and well-nourished. She is cooperative.   No apparent distress noted       HENT:   Head: Normocephalic.   Right Ear: Hearing, tympanic membrane, external ear and ear canal normal.   Left Ear: Hearing, tympanic membrane, external ear and ear canal normal.   Nose: Nose normal. Right sinus exhibits no maxillary sinus tenderness and no frontal sinus tenderness. Left sinus exhibits no maxillary sinus tenderness and no frontal sinus tenderness.   Mouth/Throat: Uvula is midline and mucous membranes are normal. Posterior oropharyngeal erythema present.   Congestion   Eyes: Pupils are equal, round, and reactive to light. Conjunctivae and EOM are normal.   Neck: Trachea normal.   Cardiovascular: Normal rate, regular rhythm, normal heart sounds and normal pulses.   Pulmonary/Chest: Effort normal and breath sounds normal. No stridor. No respiratory distress. She has no decreased breath sounds. She has no wheezes. She has no rhonchi. She has no rales. She exhibits no tenderness.   Abdominal: Normal appearance.   Musculoskeletal: Normal range of motion.   Lymphadenopathy:        Head (right side): No submental, no submandibular, no tonsillar, no preauricular, no posterior auricular and no occipital adenopathy present.        Head (left side): No submental, no submandibular, no tonsillar, no preauricular, no posterior auricular and no occipital adenopathy present.     She has no cervical adenopathy.   Neurological: She is alert and oriented to person, place, and time. GCS eye subscore is 4. GCS verbal subscore is 5. GCS motor subscore is 6.   Nursing note and vitals reviewed.        Results for orders placed or performed in visit on 10/24/19   POCT rapid strep A   Result Value Ref Range    Rapid Strep A Screen Negative Negative     Acceptable Yes      Assessment:       1. Sore throat    2. Pharyngitis, unspecified etiology        Plan:         Sore throat  -     POCT rapid strep A    Pharyngitis,  unspecified etiology  -     betamethasone acetate-betamethasone sodium phosphate injection 6 mg         Patient Instructions   General Discharge Instructions   If you were prescribed a narcotic or controlled medication, do not drive or operate heavy equipment or machinery while taking these medications.  If you were prescribed antibiotics, please take them to completion.  You must understand that you've received an Urgent Care treatment only and that you may be released before all your medical problems are known or treated. You, the patient, will arrange for follow up care as instructed.  Follow up with your PCP or specialty clinic as directed in the next 1-2 weeks if not improved or as needed.  You can call [phone] to schedule an appointment with the appropriate provider.  If your condition worsens we recommend that you receive another evaluation at the emergency room immediately or contact your primary medical clinics after hours call service to discuss your concerns.  Please return here or go to the Emergency Department for any concerns or worsening of condition.                                                            Pharyngitis   If your condition worsens or fails to improve we recommend that you receive another evaluation at the ER immediately or contact your PCP to discuss your concerns or return here. You must understand that you've received an urgent care treatment only and that you may be released before all your medical problems are known or treated. You the patient will arrange for followup care as instructed.   Tylenol or ibuprofen for pain may help as long as you are not allergic to these meds or have a medical condition such as stomach ulcers, liver or kidney disease or taking blood thinners etc that would prevent you from using these medications.   Rest and fluids will help as well.   If you were prescribed antibiotics and are female and on BCP use additional methods to prevent pregnancy  while on the antibiotics and for one cycle after

## 2020-04-30 ENCOUNTER — OFFICE VISIT (OUTPATIENT)
Dept: URGENT CARE | Facility: CLINIC | Age: 55
End: 2020-04-30
Payer: COMMERCIAL

## 2020-04-30 VITALS
HEART RATE: 81 BPM | HEIGHT: 62 IN | OXYGEN SATURATION: 99 % | WEIGHT: 147 LBS | RESPIRATION RATE: 18 BRPM | DIASTOLIC BLOOD PRESSURE: 92 MMHG | BODY MASS INDEX: 27.05 KG/M2 | SYSTOLIC BLOOD PRESSURE: 173 MMHG | TEMPERATURE: 98 F

## 2020-04-30 DIAGNOSIS — M70.32 BICIPITORADIAL BURSITIS OF LEFT ELBOW: Primary | ICD-10-CM

## 2020-04-30 PROCEDURE — 99214 PR OFFICE/OUTPT VISIT, EST, LEVL IV, 30-39 MIN: ICD-10-PCS | Mod: 25,S$GLB,, | Performed by: INTERNAL MEDICINE

## 2020-04-30 PROCEDURE — 99214 OFFICE O/P EST MOD 30 MIN: CPT | Mod: 25,S$GLB,, | Performed by: INTERNAL MEDICINE

## 2020-04-30 PROCEDURE — 96372 PR INJECTION,THERAP/PROPH/DIAG2ST, IM OR SUBCUT: ICD-10-PCS | Mod: S$GLB,,, | Performed by: FAMILY MEDICINE

## 2020-04-30 PROCEDURE — 96372 THER/PROPH/DIAG INJ SC/IM: CPT | Mod: S$GLB,,, | Performed by: FAMILY MEDICINE

## 2020-04-30 RX ORDER — DICLOFENAC SODIUM 10 MG/G
2 GEL TOPICAL 3 TIMES DAILY
Qty: 100 G | Refills: 0 | Status: SHIPPED | OUTPATIENT
Start: 2020-04-30 | End: 2024-02-15

## 2020-04-30 RX ORDER — KETOROLAC TROMETHAMINE 30 MG/ML
60 INJECTION, SOLUTION INTRAMUSCULAR; INTRAVENOUS
Status: COMPLETED | OUTPATIENT
Start: 2020-04-30 | End: 2020-04-30

## 2020-04-30 RX ORDER — NAPROXEN 500 MG/1
500 TABLET ORAL 2 TIMES DAILY WITH MEALS
Qty: 14 TABLET | Refills: 0 | Status: SHIPPED | OUTPATIENT
Start: 2020-04-30 | End: 2020-05-07

## 2020-04-30 RX ADMIN — KETOROLAC TROMETHAMINE 60 MG: 30 INJECTION, SOLUTION INTRAMUSCULAR; INTRAVENOUS at 09:04

## 2020-04-30 NOTE — LETTER
April 30, 2020      [facility] [address], [address] [place] [address]  Phone: [phone]  Fax: [phone]       Patient: Anusha Carranza   YOB: 1965  Date of Visit: 04/30/2020    To Whom It May Concern:    Russel Carranza  was at [facility] on 04/30/2020. She may return to work/school on 5/4/2020 with no restrictions. If you have any questions or concerns, or if I can be of further assistance, please do not hesitate to contact me.    Sincerely,    Simran Gavin PA-C

## 2020-04-30 NOTE — PATIENT INSTRUCTIONS
Bursitis of the Elbow (Olecranon)  Your elbow joint contains a small fluid-filled sac called a bursa. The bursa helps the muscles and tendons move smoothly over the bone. It also cushions and protects your elbow. Bursitis is when the bursa is inflamed or swollen. This is most often due to overuse of or injury to the elbow. Symptoms include swelling and pain. If the elbow is red and feels warm to the touch, the bursa itself may be infected.  In most cases, elbow bursitis resolves with medicine and self-care at home. It may take several weeks for the bursa to heal and the swelling to go away. In some cases, your healthcare provider may drain excess fluid from the bursa. Or, he or she may inject medicine directly into the bursa to help relieve symptoms. In severe cases, you may need surgery to remove the bursa may. If there is concern that the bursa is infected, your healthcare provider may prescribe antibiotics to treat the infection.    Home care  Your healthcare provider may prescribe medicine to help relieve pain and swelling. This may be an over-the-counter pain reliever or prescription pain medicine. Take all medicines as directed. To help treat or prevent infection, your provider may prescribe antibiotics. If these are prescribed, take them as directed until they are gone.  The following are general care guidelines:  · Apply an ice pack or bag of frozen peas wrapped in a thin towel to your elbow for 15 to 20 minutes at a time. Do this 3 to 4 times a day until pain and swelling improve.  · Keep your elbow raised above the level of your heart whenever possible. This helps reduce swelling. When sitting or lying down, place your arm on a pillow that rests on your chest or on a pillow at your side.  · Use an elastic wrap around the elbow joint to compress the area while it is healing. Make the wrap snug but not tight to the point of causing pain.  · Rest your elbow to give it time to heal. You may need to wear an  elbow pad to help protect and limit the movement of your elbow. During and after healing, avoid leaning on your elbows.  Follow-up care  Follow up with your healthcare provider, or as advised. If you have been referred to a specialist, make that appointment promptly.  When to seek medical advice  Call your healthcare provider right away if any of these occur:  · Fever of 100.4°F (38°C) or higher, or as advised  · Chills  · Increased pain, swelling, warmth, redness, or drainage from the joint  · Trouble moving the elbow joint  · Numbness or tingling in the hand  · Severe pain or swelling in forearm or hand  · Loss of pink color and slow return of color after squeezing fingertip or hand  Date Last Reviewed: 6/1/2016  © 9519-5726 [employer]. [address] [place] [address], [place] [address]. All rights reserved. This information is not intended as a substitute for professional medical care. Always follow your healthcare professional's instructions.      - Rest, Ice, Compression, Elevation  - Drink plenty of fluids  - Take Tylenol every 4 hours OR Ibuprofen every 6 hours as needed for pain/fever/chills/body aches (Do NOT exceed taking 4000mg/day of Tylenol or 3200mg/day of Ibuprofen)  - Take Naproxen as needed for pain/swelling on a full stomach. Do not take Ibuprofen/Advil at the same time as they are both NSAIDs and will increase your risk of bleeding  - Apply Voltaren to area as needed for pain/swelling  - Follow up with your primary care doctor or orthopedics as directed in the next 1-2 weeks if not improved or as needed. You can call [phone] to schedule an appointment with the appropriate provider.    - Please go to the Emergency Department if your symptoms worsen.

## 2020-04-30 NOTE — PROGRESS NOTES
"Subjective:       Patient ID: Anusha Carranza is a 55 y.o. female.    Vitals:  height is 5' 2" (1.575 m) and weight is 66.7 kg (147 lb). Her temperature is 98 °F (36.7 °C). Her blood pressure is 173/92 (abnormal) and her pulse is 81. Her respiration is 18 and oxygen saturation is 99%.     Chief Complaint: Arm Pain    56 y/o female with PMHx of HTN presents to urgent care c/o worsening atraumatic left elbow pain and swelling x 1 week. Pain is constant, radiates up left bicep and down left forearm, 8/10, and worse with palpation and lifting things. Pt also c/o of her left pinky finger feeling numb and tingling. Pt states that she works at [employer] lifting many heavy boxes repeatedly. Pt has been taking Advil with no relief. Pt denies recent trauma, fever, chills, cough, SOB, chest pain, N/V/D, abdominal pain, back pain, neck pain, headache, or focal weakness.      Arm Pain    The incident occurred 5 to 7 days ago. There was no injury mechanism. The pain is present in the left elbow, left fingers and left forearm. The quality of the pain is described as aching and cramping. The pain does not radiate. The pain is at a severity of 8/10. The pain is moderate. The pain has been constant since the incident. Associated symptoms include numbness. Pertinent negatives include no chest pain. Nothing aggravates the symptoms. She has tried NSAIDs for the symptoms. The treatment provided mild relief.       Constitution: Negative for chills, fatigue and fever.   HENT: Negative for congestion and sore throat.    Neck: Negative for neck pain, neck stiffness and painful lymph nodes.   Cardiovascular: Negative for chest pain, leg swelling, palpitations and sob on exertion.   Eyes: Negative for double vision and blurred vision.   Respiratory: Negative for cough and shortness of breath.    Gastrointestinal: Negative for abdominal pain, nausea, vomiting and diarrhea.   Genitourinary: Negative for dysuria, frequency, urgency and history of " kidney stones.   Musculoskeletal: Positive for joint pain and joint swelling. Negative for back pain.   Skin: Negative for color change, pale, rash and bruising.   Allergic/Immunologic: Negative for seasonal allergies.   Neurological: Positive for numbness and tingling. Negative for dizziness, history of vertigo, light-headedness, passing out and headaches.   Hematologic/Lymphatic: Negative for swollen lymph nodes.   Psychiatric/Behavioral: Negative for nervous/anxious, sleep disturbance and depression. The patient is not nervous/anxious.        Objective:      Physical Exam   Constitutional: She is oriented to person, place, and time. She appears well-developed and well-nourished. No distress.   HENT:   Head: Normocephalic and atraumatic.   Eyes: Conjunctivae and EOM are normal.   Neck: Normal range of motion. Neck supple.   Cardiovascular: Normal rate, regular rhythm and intact distal pulses.   Pulmonary/Chest: Effort normal. No respiratory distress.   Musculoskeletal: Normal range of motion.        Left elbow: She exhibits swelling. She exhibits normal range of motion. Tenderness found. Medial epicondyle and lateral epicondyle tenderness noted.   Tenderness to palpation and swelling over left lateral epicondyle and medial epicondyle over ulnar notch. Normal ROM. No warmth or erythema. 2+/2+ radial pulses. Strength and sensation intact.   Neurological: She is alert and oriented to person, place, and time. No cranial nerve deficit or sensory deficit.   Skin: Skin is warm and dry. Capillary refill takes less than 2 seconds.         Assessment:       1. Bicipitoradial bursitis of left elbow        Plan:         Bicipitoradial bursitis of left elbow  -     ketorolac injection 60 mg  -     naproxen (EC NAPROSYN) 500 MG EC tablet; Take 1 tablet (500 mg total) by mouth 2 (two) times daily with meals. for 7 days  Dispense: 14 tablet; Refill: 0  -     diclofenac sodium (VOLTAREN) 1 % Gel; Apply 2 g topically 3 (three)  times daily.  Dispense: 100 g; Refill: 0      Patient Instructions     Bursitis of the Elbow (Olecranon)  Your elbow joint contains a small fluid-filled sac called a bursa. The bursa helps the muscles and tendons move smoothly over the bone. It also cushions and protects your elbow. Bursitis is when the bursa is inflamed or swollen. This is most often due to overuse of or injury to the elbow. Symptoms include swelling and pain. If the elbow is red and feels warm to the touch, the bursa itself may be infected.  In most cases, elbow bursitis resolves with medicine and self-care at home. It may take several weeks for the bursa to heal and the swelling to go away. In some cases, your healthcare provider may drain excess fluid from the bursa. Or, he or she may inject medicine directly into the bursa to help relieve symptoms. In severe cases, you may need surgery to remove the bursa may. If there is concern that the bursa is infected, your healthcare provider may prescribe antibiotics to treat the infection.    Home care  Your healthcare provider may prescribe medicine to help relieve pain and swelling. This may be an over-the-counter pain reliever or prescription pain medicine. Take all medicines as directed. To help treat or prevent infection, your provider may prescribe antibiotics. If these are prescribed, take them as directed until they are gone.  The following are general care guidelines:  · Apply an ice pack or bag of frozen peas wrapped in a thin towel to your elbow for 15 to 20 minutes at a time. Do this 3 to 4 times a day until pain and swelling improve.  · Keep your elbow raised above the level of your heart whenever possible. This helps reduce swelling. When sitting or lying down, place your arm on a pillow that rests on your chest or on a pillow at your side.  · Use an elastic wrap around the elbow joint to compress the area while it is healing. Make the wrap snug but not tight to the point of causing  pain.  · Rest your elbow to give it time to heal. You may need to wear an elbow pad to help protect and limit the movement of your elbow. During and after healing, avoid leaning on your elbows.  Follow-up care  Follow up with your healthcare provider, or as advised. If you have been referred to a specialist, make that appointment promptly.  When to seek medical advice  Call your healthcare provider right away if any of these occur:  · Fever of 100.4°F (38°C) or higher, or as advised  · Chills  · Increased pain, swelling, warmth, redness, or drainage from the joint  · Trouble moving the elbow joint  · Numbness or tingling in the hand  · Severe pain or swelling in forearm or hand  · Loss of pink color and slow return of color after squeezing fingertip or hand  Date Last Reviewed: 6/1/2016  © 4605-3924 [employer] [address] [place] [address]. All rights reserved. This information is not intended as a substitute for professional medical care. Always follow your healthcare professional's instructions.      - Rest, Ice, Compression, Elevation  - Drink plenty of fluids  - Take Tylenol every 4 hours OR Ibuprofen every 6 hours as needed for pain/fever/chills/body aches (Do NOT exceed taking 4000mg/day of Tylenol or 3200mg/day of Ibuprofen)  - Take Naproxen as needed for pain/swelling on a full stomach. Do not take Ibuprofen/Advil at the same time as they are both NSAIDs and will increase your risk of bleeding  - Apply Voltaren to area as needed for pain/swelling  - Follow up with your primary care doctor or orthopedics as directed in the next 1-2 weeks if not improved or as needed. You can call [phone] to schedule an appointment with the appropriate provider.    - Please go to the Emergency Department if your symptoms worsen.

## 2020-05-01 ENCOUNTER — TELEPHONE (OUTPATIENT)
Dept: FAMILY MEDICINE | Facility: CLINIC | Age: 55
End: 2020-05-01

## 2020-05-01 NOTE — TELEPHONE ENCOUNTER
Spoke with patient and she informed me that she was seen in the urgent care on yesterday and received injection but still having numbness and itching to the left finger. Scheduled virtual visit with provider for today.

## 2020-05-01 NOTE — TELEPHONE ENCOUNTER
----- Message from Kandy Mattson sent at 5/1/2020  9:05 AM CDT -----  Contact: Anusha [phone]  Name of Caller: Anusha     Reason for Visit/Symptoms: left pinky finger is numb and hurting, patient was seen at the Urgent care for it.    Best Contact Number or Confirm if Mychart Preferred:[phone]    Preferred Date/Time of Appointment: [phone]    Interested in Virtual Visit (yes/no): no    Additional Information: Patient stated that she was seen at the Urgent care for her left pinky where she was given an injection. Patient stated that along with the numbness and pain, her finger is also itching

## 2020-05-13 ENCOUNTER — OFFICE VISIT (OUTPATIENT)
Dept: FAMILY MEDICINE | Facility: CLINIC | Age: 55
End: 2020-05-13
Payer: COMMERCIAL

## 2020-05-13 DIAGNOSIS — I10 ESSENTIAL HYPERTENSION, BENIGN: Primary | Chronic | ICD-10-CM

## 2020-05-13 PROCEDURE — 99214 PR OFFICE/OUTPT VISIT, EST, LEVL IV, 30-39 MIN: ICD-10-PCS | Mod: 95,,, | Performed by: FAMILY MEDICINE

## 2020-05-13 PROCEDURE — 99214 OFFICE O/P EST MOD 30 MIN: CPT | Mod: 95,,, | Performed by: FAMILY MEDICINE

## 2020-05-13 RX ORDER — AMLODIPINE BESYLATE 5 MG/1
5 TABLET ORAL DAILY
Qty: 30 TABLET | Refills: 11 | Status: SHIPPED | OUTPATIENT
Start: 2020-05-13 | End: 2020-10-05 | Stop reason: SDUPTHER

## 2020-05-13 NOTE — PROGRESS NOTES
[facility]  Progress Note    SUBJECTIVE:     Chief Complaint   Patient presents with    Hypertension       The patient location is: Home  The chief complaint leading to consultation is: Hypertension    Visit type: Virtual visit with synchronous audio and video  Total time spent with patient: 25  Each patient to whom he or she provides medical services by telemedicine is:  (1) informed of the relationship between the physician and patient and the respective role of any other health care provider with respect to management of the patient; and (2) notified that he or she may decline to receive medical services by telemedicine and may withdraw from such care at any time.      HPI: Patient is a 55 y.o. female via virtual visit, here for c/o her blood pressure being high latley. SBP has been around 140-150s. Does have some pain due to her wrist which she is seeing orthopedics for. No chest pain, SOB. Takes meds as directed. Patient has no other new complaints/problems at this time.      Review of patient's allergies indicates:   Allergen Reactions    Benadryl [diphenhydramine hcl] Other (See Comments)     Increases Heart Rate     Pcn [penicillins] Swelling       Past Medical History:   Diagnosis Date    Hypertension      Past Surgical History:   Procedure Laterality Date    CHOLECYSTECTOMY      EYE SURGERY Bilateral 2009    [facility]  2002    [facility]      Family History   Problem Relation Age of Onset    Heart disease Mother     Diabetes Mother     Stroke Neg Hx     Breast cancer Neg Hx     Colon cancer Neg Hx     Ovarian cancer Neg Hx      Social History     Tobacco Use    Smoking status: Never Smoker    Smokeless tobacco: Never Used   Substance Use Topics    Alcohol use: Yes     Frequency: Monthly or less     Comment: Social     Drug use: No        Review of Systems   Constitutional: Negative for malaise/fatigue.   Eyes: Negative for blurred vision.   Respiratory: Negative for  shortness of breath.    Cardiovascular: Negative for chest pain, palpitations, orthopnea and PND.   Musculoskeletal: Positive for joint pain (L wrist). Negative for neck pain.   Neurological: Negative for headaches.     OBJECTIVE:   There were no vitals filed for this visit.  There is no height or weight on file to calculate BMI.    Physical Exam   Constitutional: She is oriented to person, place, and time and well-developed, well-nourished, and in no distress. She appears not lethargic.  Non-toxic appearance. No distress.   HENT:   Head: Normocephalic and atraumatic.   Eyes: Conjunctivae and EOM are normal. Right eye exhibits no discharge and no exudate. No foreign body present in the right eye. Left eye exhibits no discharge and no exudate. No foreign body present in the left eye. Right conjunctiva is not injected. Right conjunctiva has no hemorrhage. Left conjunctiva is not injected. Left conjunctiva has no hemorrhage. No scleral icterus.   Neck: Normal range of motion.   Pulmonary/Chest: Effort normal. No respiratory distress.   Neurological: She is oriented to person, place, and time. She appears not lethargic.   Skin: No rash noted. She is not diaphoretic.   Psychiatric: Her affect is not blunt, not labile and not inappropriate. She is not agitated.       Old records were reviewed. Labs and/or images were independently reviewed.    ASSESSMENT     1. Essential hypertension, benign        PLAN:     Essential hypertension, benign  -     Start amLODIPine (NORVASC) 5 MG tablet; Take 1 tablet (5 mg total) by mouth once daily.  Dispense: 30 tablet; Refill: 11  -     Educated patient to take medications as prescribed, and to record bp logs daily to bring along to next visit. Instructed patient to decrease salt intake. Also told patient to call if any new signs or symptoms develop.    More than 50% of the encounter was spent counseling patient about hypertension, in an outpatient setting. Total time spent counseling  patient: 25.    RTC PRN    Bobby Allen MD  05/13/2020 9:23 AM

## 2020-05-19 ENCOUNTER — OFFICE VISIT (OUTPATIENT)
Dept: URGENT CARE | Facility: CLINIC | Age: 55
End: 2020-05-19
Payer: COMMERCIAL

## 2020-05-19 VITALS
OXYGEN SATURATION: 98 % | SYSTOLIC BLOOD PRESSURE: 148 MMHG | HEART RATE: 102 BPM | BODY MASS INDEX: 26.68 KG/M2 | WEIGHT: 145 LBS | TEMPERATURE: 98 F | HEIGHT: 62 IN | RESPIRATION RATE: 18 BRPM | DIASTOLIC BLOOD PRESSURE: 79 MMHG

## 2020-05-19 DIAGNOSIS — S59.919A FISH HOOK IN FOREARM: Primary | ICD-10-CM

## 2020-05-19 PROCEDURE — 90471 TD VACCINE GREATER THAN OR EQUAL TO 7YO WITH PRESERVATIVE IM: ICD-10-PCS | Mod: S$GLB,,, | Performed by: NURSE PRACTITIONER

## 2020-05-19 PROCEDURE — 90714 TD VACCINE GREATER THAN OR EQUAL TO 7YO WITH PRESERVATIVE IM: ICD-10-PCS | Mod: S$GLB,,, | Performed by: NURSE PRACTITIONER

## 2020-05-19 PROCEDURE — 12001 RPR S/N/AX/GEN/TRNK 2.5CM/<: CPT | Mod: S$GLB,,, | Performed by: NURSE PRACTITIONER

## 2020-05-19 PROCEDURE — 90714 TD VACC NO PRESV 7 YRS+ IM: CPT | Mod: S$GLB,,, | Performed by: NURSE PRACTITIONER

## 2020-05-19 PROCEDURE — 99214 PR OFFICE/OUTPT VISIT, EST, LEVL IV, 30-39 MIN: ICD-10-PCS | Mod: 25,S$GLB,, | Performed by: NURSE PRACTITIONER

## 2020-05-19 PROCEDURE — 90471 IMMUNIZATION ADMIN: CPT | Mod: S$GLB,,, | Performed by: NURSE PRACTITIONER

## 2020-05-19 PROCEDURE — 99214 OFFICE O/P EST MOD 30 MIN: CPT | Mod: 25,S$GLB,, | Performed by: NURSE PRACTITIONER

## 2020-05-19 PROCEDURE — 12001 LACERATION REPAIR: ICD-10-PCS | Mod: S$GLB,,, | Performed by: NURSE PRACTITIONER

## 2020-05-19 RX ORDER — MUPIROCIN 20 MG/G
OINTMENT TOPICAL 3 TIMES DAILY
Qty: 30 G | Refills: 0 | Status: SHIPPED | OUTPATIENT
Start: 2020-05-19

## 2020-05-19 NOTE — PATIENT INSTRUCTIONS
Change dressing once daily. Wash with mild soap and water. Keep covered. Return for stitch removal in 7-10 days.       Fish Hook Removed  A fish hook has been removed from under your skin. This area may be sore for the next 1 to 2 days. Because this was a dirty puncture-type wound, the risk of infection is higher than normal. Antibiotics may or may not be prescribed depending on various factors such as depth, severity, and location. You may be given a tetanus shot if needed.  Home care  Your healthcare provider will give you instructions on how to care for your wound. These will depend on where the wound is and how serious it is. The following may help you care for your wound at home:  · Keep the injured part elevated during the first 2 days. This will help reduce swelling and pain.  · Keep the wound clean and dry. If a bandage was applied and it becomes wet or dirty, replace it. Otherwise, leave it in place for the first 24 hours.  · Shower as usual, unless your healthcare provider tells you otherwise.  · Don't soak in a tub or go swimming for at least 1 week or as instructed by your doctor.  · Don't soak the injured area unless your doctor tells you to.  · Use acetaminophen or ibuprofen to control pain, unless another pain medicine was prescribed. If you have chronic liver or kidney disease or ever had a stomach ulcer or GI bleeding, talk with your doctor before using these medicines.  Follow-up care  Most puncture wounds heal within 10 days. However, an infection may sometimes occur despite proper treatment. Check the wound daily for the warning signs listed below.  If stitches were used, they should be removed within 7 to 10 days. If a tape closure was used, remove them after 5 days unless told otherwise.  If any X-rays were taken, a radiologist will look at them. You will be notified if there are new findings that may affect your care.  When to seek medical advice  Call your healthcare provider if any of these  occur:  · Increasing pain in the wound  · Redness, swelling or pus coming from the wound  · Fever of 100.4ºF (38ºC) or higher, or as directed by your healthcare provider  Date Last Reviewed: 10/1/2016  © 6905-8509 [employer]. [address] [place] [address]. All rights reserved. This information is not intended as a substitute for professional medical care. Always follow your healthcare professional's instructions.

## 2020-05-19 NOTE — PROGRESS NOTES
"Subjective:       Patient ID: Anusha Carranza is a 55 y.o. female.    Vitals:  height is 5' 2" (1.575 m) and weight is 65.8 kg (145 lb). Her temperature is 97.5 °F (36.4 °C). Her blood pressure is 148/79 (abnormal) and her pulse is 102. Her respiration is 18 and oxygen saturation is 98%.     Chief Complaint: Trauma (fish hook)    Pt was fishing this afternoon and ended up with a fish hook inbedded in her R forearm. She is unsure when her last tetanus shot was.     Trauma   The incident occurred less than 1 hour ago. The incident occurred at home. She is experiencing no pain. It is certain that a foreign body is present. Pertinent negatives include no abdominal pain, coughing, light-headedness, loss of consciousness, nausea, vomiting or weakness. There have been no prior injuries to these areas. Her tetanus status is out of date.       Constitution: Negative for chills, sweating, fatigue and fever.   HENT: Negative for ear pain, facial swelling, facial trauma, congestion, sinus pain, sinus pressure, sore throat and voice change.    Neck: Negative for neck stiffness and painful lymph nodes.   Cardiovascular: Negative for chest trauma.   Eyes: Negative for eye trauma, eye redness, double vision and blurred vision.   Respiratory: Negative for chest tightness, cough, sputum production, bloody sputum, COPD, shortness of breath, stridor, wheezing and asthma.    Gastrointestinal: Negative for abdominal trauma, abdominal pain, nausea, vomiting and rectal bleeding.   Genitourinary: Negative for hematuria, missed menses, genital trauma and pelvic pain.   Musculoskeletal: Positive for trauma. Negative for pain, joint swelling, abnormal ROM of joint and muscle ache.   Skin: Positive for laceration. Negative for color change, rash, wound, abrasion and bruising.   Allergic/Immunologic: Negative for seasonal allergies and asthma.   Neurological: Negative for dizziness, history of vertigo, light-headedness, coordination " disturbances, altered mental status and loss of consciousness.   Hematologic/Lymphatic: Negative for swollen lymph nodes and history of bleeding disorder.   Psychiatric/Behavioral: Negative for altered mental status.       Objective:      Physical Exam   Constitutional: She is oriented to person, place, and time. Vital signs are normal. She appears well-developed and well-nourished. She is active and cooperative. No distress.   HENT:   Head: Normocephalic and atraumatic.   Nose: Nose normal.   Mouth/Throat: Oropharynx is clear and moist and mucous membranes are normal.   Eyes: Conjunctivae and lids are normal.   Neck: Trachea normal, normal range of motion, full passive range of motion without pain and phonation normal. Neck supple.   Cardiovascular: Normal rate, regular rhythm, normal heart sounds, intact distal pulses and normal pulses.   Pulmonary/Chest: Effort normal and breath sounds normal.   Abdominal: Soft. Normal appearance and bowel sounds are normal. She exhibits no abdominal bruit, no pulsatile midline mass and no mass.   Musculoskeletal: She exhibits no edema or deformity.        Arms:  Neurological: She is alert and oriented to person, place, and time. She has normal strength and normal reflexes. No sensory deficit.   Skin: Skin is warm, dry, intact and not diaphoretic.   Psychiatric: She has a normal mood and affect. Her speech is normal and behavior is normal. Judgment and thought content normal. Cognition and memory are normal.   Nursing note and vitals reviewed.  Laceration Repair  Date/Time: 5/19/2020 6:05 PM  Performed by: Chinyere Orozco NP  Authorized by: Chinyere Orozco NP   Body area: upper extremity  Location details: right lower arm  Laceration length: 1 cm  Foreign bodies: metal (fishhook)  Anesthesia: local infiltration    Anesthesia:  Local Anesthetic: lidocaine 1% with epinephrine  Irrigation solution: saline  Skin closure: 4-0 nylon  Number of sutures: 1  Technique:  simple  Approximation: close  Approximation difficulty: simple  Dressing: dressing applied and antibiotic ointment  Patient tolerance: Patient tolerated the procedure well with no immediate complications  Comments: Small incision made and fishhook removed easily. Closed with one suture.               Assessment:       1. Fish hook in forearm        Plan:         Fish hook in forearm  -     mupirocin (BACTROBAN) 2 % ointment; Apply topically 3 (three) times daily.  Dispense: 30 g; Refill: 0  -     (In Office Administered) Td Vaccine         Reviewed previous pertinent office visits, PMH, PSH, fam hx  Discussed wound care instructions  Advised on return/follow-up precautions. Advised on ER precautions. Answered all patient questions. Patient verbalized understanding and voiced agreement with current treatment plan.    Patient Instructions   Change dressing once daily. Wash with mild soap and water. Keep covered. Return for stitch removal in 7-10 days.       Fish Hook Removed  A fish hook has been removed from under your skin. This area may be sore for the next 1 to 2 days. Because this was a dirty puncture-type wound, the risk of infection is higher than normal. Antibiotics may or may not be prescribed depending on various factors such as depth, severity, and location. You may be given a tetanus shot if needed.  Home care  Your healthcare provider will give you instructions on how to care for your wound. These will depend on where the wound is and how serious it is. The following may help you care for your wound at home:  · Keep the injured part elevated during the first 2 days. This will help reduce swelling and pain.  · Keep the wound clean and dry. If a bandage was applied and it becomes wet or dirty, replace it. Otherwise, leave it in place for the first 24 hours.  · Shower as usual, unless your healthcare provider tells you otherwise.  · Don't soak in a tub or go swimming for at least 1 week or as instructed by  your doctor.  · Don't soak the injured area unless your doctor tells you to.  · Use acetaminophen or ibuprofen to control pain, unless another pain medicine was prescribed. If you have chronic liver or kidney disease or ever had a stomach ulcer or GI bleeding, talk with your doctor before using these medicines.  Follow-up care  Most puncture wounds heal within 10 days. However, an infection may sometimes occur despite proper treatment. Check the wound daily for the warning signs listed below.  If stitches were used, they should be removed within 7 to 10 days. If a tape closure was used, remove them after 5 days unless told otherwise.  If any X-rays were taken, a radiologist will look at them. You will be notified if there are new findings that may affect your care.  When to seek medical advice  Call your healthcare provider if any of these occur:  · Increasing pain in the wound  · Redness, swelling or pus coming from the wound  · Fever of 100.4ºF (38ºC) or higher, or as directed by your healthcare provider  Date Last Reviewed: 10/1/2016  © 3137-2336 [employer]. [address] [place] [address], [place] [address]. All rights reserved. This information is not intended as a substitute for professional medical care. Always follow your healthcare professional's instructions.

## 2020-05-29 ENCOUNTER — CLINICAL SUPPORT (OUTPATIENT)
Dept: URGENT CARE | Facility: CLINIC | Age: 55
End: 2020-05-29
Payer: COMMERCIAL

## 2020-05-29 VITALS
HEIGHT: 62 IN | RESPIRATION RATE: 16 BRPM | OXYGEN SATURATION: 97 % | DIASTOLIC BLOOD PRESSURE: 79 MMHG | WEIGHT: 145 LBS | HEART RATE: 90 BPM | TEMPERATURE: 97 F | SYSTOLIC BLOOD PRESSURE: 129 MMHG | BODY MASS INDEX: 26.68 KG/M2

## 2020-05-29 DIAGNOSIS — Z48.02 ENCOUNTER FOR REMOVAL OF SUTURES: Primary | ICD-10-CM

## 2020-05-29 PROCEDURE — 99499 UNLISTED E&M SERVICE: CPT | Mod: S$GLB,,, | Performed by: NURSE PRACTITIONER

## 2020-05-29 PROCEDURE — 99024 POSTOP FOLLOW-UP VISIT: CPT | Mod: S$GLB,,, | Performed by: NURSE PRACTITIONER

## 2020-05-29 PROCEDURE — 99499 NO LOS: ICD-10-PCS | Mod: S$GLB,,, | Performed by: NURSE PRACTITIONER

## 2020-05-29 PROCEDURE — 99024 SUTURE REMOVAL: ICD-10-PCS | Mod: S$GLB,,, | Performed by: NURSE PRACTITIONER

## 2020-05-29 NOTE — PROGRESS NOTES
"Subjective:       Patient ID: Anusha Carranza is a 55 y.o. female.    Vitals:  height is 5' 2" (1.575 m) and weight is 65.8 kg (145 lb). Her temperature is 97.4 °F (36.3 °C). Her blood pressure is 129/79 and her pulse is 90. Her respiration is 16 and oxygen saturation is 97%.     Chief Complaint: Suture / Staple Removal    Pt is here for stitch removal on her right forearm from a fish hook. She denies any problems and states been healing well with ointment use.     Suture / Staple Removal   The sutures were placed 7 to 10 days ago. She tried antibiotic ointment use since the wound repair. The treatment provided moderate relief. Her temperature was unmeasured prior to arrival. There has been no drainage from the wound. The redness has improved. There is no swelling present. There is no pain present. She has no difficulty moving the affected extremity or digit.       Constitution: Negative for chills, fatigue and fever.   HENT: Negative for congestion and sore throat.    Neck: Negative for painful lymph nodes.   Cardiovascular: Negative for chest pain and leg swelling.   Eyes: Negative for double vision and blurred vision.   Respiratory: Negative for cough and shortness of breath.    Gastrointestinal: Negative for nausea, vomiting and diarrhea.   Genitourinary: Negative for dysuria, frequency, urgency and history of kidney stones.   Musculoskeletal: Negative for joint pain, joint swelling, muscle cramps and muscle ache.   Skin: Negative for color change, pale, rash, erythema and bruising.   Allergic/Immunologic: Negative for seasonal allergies.   Neurological: Negative for dizziness, history of vertigo, light-headedness, passing out and headaches.   Hematologic/Lymphatic: Negative for swollen lymph nodes.   Psychiatric/Behavioral: Negative for nervous/anxious, sleep disturbance and depression. The patient is not nervous/anxious.        Objective:      Physical Exam   Constitutional: She is oriented to person, place, " and time. She appears well-developed and well-nourished.   HENT:   Head: Normocephalic and atraumatic. Head is without abrasion, without contusion and without laceration.   Right Ear: External ear normal.   Left Ear: External ear normal.   Nose: Nose normal.   Mouth/Throat: Oropharynx is clear and moist and mucous membranes are normal.   Eyes: Pupils are equal, round, and reactive to light. Conjunctivae, EOM and lids are normal.   Neck: Trachea normal, full passive range of motion without pain and phonation normal. Neck supple.   Cardiovascular: Normal rate, regular rhythm and normal heart sounds.   Pulmonary/Chest: Effort normal and breath sounds normal. No stridor. No respiratory distress.   Musculoskeletal: Normal range of motion.   Neurological: She is alert and oriented to person, place, and time.   Skin: Skin is warm, dry, intact and no rash. Capillary refill takes less than 2 seconds. abrasion, burn, bruising, erythema and ecchymosis       Psychiatric: She has a normal mood and affect. Her speech is normal and behavior is normal. Judgment and thought content normal. Cognition and memory are normal.   Nursing note and vitals reviewed.        Assessment:       1. Encounter for removal of sutures        Plan:         Encounter for removal of sutures  -     Suture Removal           Patient Instructions   Apply ointment twice a day  Return with any concerns of infection as discussed.       You must understand that you've received an Urgent Care treatment only and that you may be released before all your medical problems are known or treated. You, the patient, will arrange for follow up care as instructed.  If your condition worsens we recommend that you receive another evaluation at the emergency room immediately or contact your primary medical clinics after hours call service to discuss your concerns.  Please return here or go to the Emergency Department for any concerns or worsening of condition.        Suture  "or Staple Removal  You were seen today for a suture or staple removal. Your wound is healing as expected. The wound has healed well enough that the sutures or staples can be removed. The wound will continue to heal for the next few months.  At this time there is no sign of infection.   Home care  · If you have pain, take pain medicine as advised by your healthcare provider.   · Keep your wound clean and protected by covering it with a bandage for the next week or so.   · Wash your hands with soap and warm water before and after caring for your wound. This helps prevent infection.  · Clean the wound gently with soap and warm water daily or as directed by your childs health care provider. Do not use iodine, alcohol, or other cleansers on the wound.  Gently pat it dry. Put on a new bandage, if needed. Do not reuse bandages.  · If the area gets wet, gently pat it dry with a clean cloth. Replace the wet bandage with a dry one.  · Check the wound daily for signs of infection. (These are listed under "When to seek medical advice" below.)  · You may shower and bathe as usual. Swimming is now permitted.  Follow-up care  Follow up with your healthcare provider as advised.  When to seek medical advice   Call your healthcare provider if any of the following occur:  · Wound reopens or bleeds  · Signs of an infection, such as:  ¨ Increasing redness or swelling around the wound  ¨ Increased warmth from the wound  ¨ Worsening pain  ¨ Red streaking lines away from the wound  ¨ Fluid draining from the wound  · Fever of 100.4°F (38°C) or higher, or as directed by your child's healthcare provider  Date Last Reviewed: 9/27/2015  © 1035-3717 [employer]. [address] [place] [address], [place] [address]. All rights reserved. This information is not intended as a substitute for professional medical care. Always follow your healthcare professional's instructions.            "

## 2020-05-29 NOTE — PROCEDURES
Suture Removal  Date/Time: 5/29/2020 9:20 AM  Location procedure was performed: [facility]  Performed by: Shweta Conteh NP  Authorized by: Shweta Conteh NP   Pre-operative diagnosis: fish hook  Post-operative diagnosis: repair  Body area: upper extremity  Location details: right lower arm  Wound Appearance: clean, well healed, normal color and no drainage  Sutures Removed: 1  Post-removal: bandaid applied and antibiotic ointment applied  Facility: sutures placed in this facility  Complications: No  Estimated blood loss (mL): 0  Specimens: No  Implants: No  Patient tolerance: Patient tolerated the procedure well with no immediate complications

## 2020-05-29 NOTE — PATIENT INSTRUCTIONS
"Apply ointment twice a day  Return with any concerns of infection as discussed.       You must understand that you've received an Urgent Care treatment only and that you may be released before all your medical problems are known or treated. You, the patient, will arrange for follow up care as instructed.  If your condition worsens we recommend that you receive another evaluation at the emergency room immediately or contact your primary medical clinics after hours call service to discuss your concerns.  Please return here or go to the Emergency Department for any concerns or worsening of condition.        Suture or Staple Removal  You were seen today for a suture or staple removal. Your wound is healing as expected. The wound has healed well enough that the sutures or staples can be removed. The wound will continue to heal for the next few months.  At this time there is no sign of infection.   Home care  · If you have pain, take pain medicine as advised by your healthcare provider.   · Keep your wound clean and protected by covering it with a bandage for the next week or so.   · Wash your hands with soap and warm water before and after caring for your wound. This helps prevent infection.  · Clean the wound gently with soap and warm water daily or as directed by your childs health care provider. Do not use iodine, alcohol, or other cleansers on the wound.  Gently pat it dry. Put on a new bandage, if needed. Do not reuse bandages.  · If the area gets wet, gently pat it dry with a clean cloth. Replace the wet bandage with a dry one.  · Check the wound daily for signs of infection. (These are listed under "When to seek medical advice" below.)  · You may shower and bathe as usual. Swimming is now permitted.  Follow-up care  Follow up with your healthcare provider as advised.  When to seek medical advice   Call your healthcare provider if any of the following occur:  · Wound reopens or bleeds  · Signs of an infection, " such as:  ¨ Increasing redness or swelling around the wound  ¨ Increased warmth from the wound  ¨ Worsening pain  ¨ Red streaking lines away from the wound  ¨ Fluid draining from the wound  · Fever of 100.4°F (38°C) or higher, or as directed by your child's healthcare provider  Date Last Reviewed: 9/27/2015  © 2303-7708 [employer] [address] [place] [address], [place] [address]. All rights reserved. This information is not intended as a substitute for professional medical care. Always follow your healthcare professional's instructions.

## 2020-09-25 DIAGNOSIS — I10 ESSENTIAL HYPERTENSION, BENIGN: ICD-10-CM

## 2020-10-05 ENCOUNTER — OFFICE VISIT (OUTPATIENT)
Dept: FAMILY MEDICINE | Facility: CLINIC | Age: 55
End: 2020-10-05
Payer: COMMERCIAL

## 2020-10-05 VITALS
OXYGEN SATURATION: 97 % | HEART RATE: 94 BPM | HEIGHT: 62 IN | WEIGHT: 153.13 LBS | BODY MASS INDEX: 28.18 KG/M2 | SYSTOLIC BLOOD PRESSURE: 134 MMHG | DIASTOLIC BLOOD PRESSURE: 72 MMHG | TEMPERATURE: 98 F

## 2020-10-05 DIAGNOSIS — E03.9 HYPOTHYROIDISM, UNSPECIFIED TYPE: ICD-10-CM

## 2020-10-05 DIAGNOSIS — Z23 NEED FOR SHINGLES VACCINE: ICD-10-CM

## 2020-10-05 DIAGNOSIS — I10 ESSENTIAL HYPERTENSION, BENIGN: Primary | Chronic | ICD-10-CM

## 2020-10-05 DIAGNOSIS — Z00.00 ROUTINE PHYSICAL EXAMINATION: ICD-10-CM

## 2020-10-05 DIAGNOSIS — Z12.31 ENCOUNTER FOR SCREENING MAMMOGRAM FOR BREAST CANCER: ICD-10-CM

## 2020-10-05 PROCEDURE — 99214 OFFICE O/P EST MOD 30 MIN: CPT | Mod: 25,S$GLB,, | Performed by: FAMILY MEDICINE

## 2020-10-05 PROCEDURE — 3008F PR BODY MASS INDEX (BMI) DOCUMENTED: ICD-10-PCS | Mod: CPTII,S$GLB,, | Performed by: FAMILY MEDICINE

## 2020-10-05 PROCEDURE — 90750 ZOSTER RECOMBINANT VACCINE: ICD-10-PCS | Mod: S$GLB,,, | Performed by: FAMILY MEDICINE

## 2020-10-05 PROCEDURE — 90471 ZOSTER RECOMBINANT VACCINE: ICD-10-PCS | Mod: S$GLB,,, | Performed by: FAMILY MEDICINE

## 2020-10-05 PROCEDURE — 99214 PR OFFICE/OUTPT VISIT, EST, LEVL IV, 30-39 MIN: ICD-10-PCS | Mod: 25,S$GLB,, | Performed by: FAMILY MEDICINE

## 2020-10-05 PROCEDURE — 99999 PR PBB SHADOW E&M-EST. PATIENT-LVL III: ICD-10-PCS | Mod: PBBFAC,,, | Performed by: FAMILY MEDICINE

## 2020-10-05 PROCEDURE — 90471 IMMUNIZATION ADMIN: CPT | Mod: S$GLB,,, | Performed by: FAMILY MEDICINE

## 2020-10-05 PROCEDURE — 90750 HZV VACC RECOMBINANT IM: CPT | Mod: S$GLB,,, | Performed by: FAMILY MEDICINE

## 2020-10-05 PROCEDURE — 3078F PR MOST RECENT DIASTOLIC BLOOD PRESSURE < 80 MM HG: ICD-10-PCS | Mod: CPTII,S$GLB,, | Performed by: FAMILY MEDICINE

## 2020-10-05 PROCEDURE — 3075F SYST BP GE 130 - 139MM HG: CPT | Mod: CPTII,S$GLB,, | Performed by: FAMILY MEDICINE

## 2020-10-05 PROCEDURE — 3078F DIAST BP <80 MM HG: CPT | Mod: CPTII,S$GLB,, | Performed by: FAMILY MEDICINE

## 2020-10-05 PROCEDURE — 3008F BODY MASS INDEX DOCD: CPT | Mod: CPTII,S$GLB,, | Performed by: FAMILY MEDICINE

## 2020-10-05 PROCEDURE — 99999 PR PBB SHADOW E&M-EST. PATIENT-LVL III: CPT | Mod: PBBFAC,,, | Performed by: FAMILY MEDICINE

## 2020-10-05 PROCEDURE — 3075F PR MOST RECENT SYSTOLIC BLOOD PRESS GE 130-139MM HG: ICD-10-PCS | Mod: CPTII,S$GLB,, | Performed by: FAMILY MEDICINE

## 2020-10-05 RX ORDER — AMLODIPINE BESYLATE 10 MG/1
10 TABLET ORAL DAILY
Qty: 90 TABLET | Refills: 3 | Status: SHIPPED | OUTPATIENT
Start: 2020-10-05 | End: 2021-09-17 | Stop reason: SDUPTHER

## 2020-10-05 RX ORDER — LEVOTHYROXINE SODIUM 50 UG/1
50 TABLET ORAL DAILY
Qty: 90 TABLET | Refills: 3 | Status: SHIPPED | OUTPATIENT
Start: 2020-10-05 | End: 2021-09-17 | Stop reason: SDUPTHER

## 2020-10-05 RX ORDER — LOSARTAN POTASSIUM 50 MG/1
50 TABLET ORAL DAILY
Qty: 90 TABLET | Refills: 3 | Status: SHIPPED | OUTPATIENT
Start: 2020-10-05 | End: 2021-09-17 | Stop reason: SDUPTHER

## 2020-10-05 NOTE — PROGRESS NOTES
[facility]  Progress Note    SUBJECTIVE:     Chief Complaint   Patient presents with    Hypertension       HPI   Anusha Carranza  is a 55 y.o. female here for follow-up of her chronic conditions. SBP around 140s. No chest pain, SOB.  Patient has no other new complaints/problems at this time.      Review of patient's allergies indicates:   Allergen Reactions    Benadryl [diphenhydramine hcl] Other (See Comments)     Increases Heart Rate     Pcn [penicillins] Swelling       Past Medical History:   Diagnosis Date    Hypertension      Past Surgical History:   Procedure Laterality Date    CHOLECYSTECTOMY      EYE SURGERY Bilateral 2009    HYSTERECTOMY  2002    TVH   ([facility])      Family History   Problem Relation Age of Onset    Heart disease Mother     Diabetes Mother     Stroke Neg Hx     Breast cancer Neg Hx     Colon cancer Neg Hx     Ovarian cancer Neg Hx      Social History     Tobacco Use    Smoking status: Never Smoker    Smokeless tobacco: Never Used   Substance Use Topics    Alcohol use: Yes     Frequency: Monthly or less     Comment: Social     Drug use: No        Review of Systems   Constitutional: Negative for chills, fever and malaise/fatigue.   HENT: Negative.    Respiratory: Negative.  Negative for cough and shortness of breath.    Cardiovascular: Negative.  Negative for chest pain.   Gastrointestinal: Negative.  Negative for abdominal pain, nausea and vomiting.   Genitourinary: Negative.    Neurological: Negative for weakness and headaches.   All other systems reviewed and are negative.    OBJECTIVE:     Vitals:    10/05/20 1441   BP: 134/72   Pulse: 94   Temp: 98.2 °F (36.8 °C)     Body mass index is 28 kg/m².    Physical Exam   Constitutional: She is oriented to person, place, and time and well-developed, well-nourished, and in no distress. No distress.   HENT:   Head: Normocephalic and atraumatic.   Nose: Nose normal.   Eyes: Conjunctivae and EOM are normal.    Cardiovascular: Normal rate, regular rhythm and normal heart sounds. Exam reveals no gallop and no friction rub.   No murmur heard.  Pulmonary/Chest: Effort normal and breath sounds normal. No respiratory distress. She has no wheezes. She has no rales. She exhibits no tenderness.   Abdominal: Soft. Bowel sounds are normal. She exhibits no distension. There is no abdominal tenderness. There is no rebound.   Neurological: She is alert and oriented to person, place, and time.   Skin: Skin is warm. She is not diaphoretic.       Old records were reviewed. Labs and/or images were independently reviewed.    ASSESSMENT     1. Essential hypertension, benign    2. Hypothyroidism, unspecified type    3. Routine physical examination    4. Encounter for screening mammogram for breast cancer    5. Need for shingles vaccine        PLAN:     Essential hypertension, benign  -     Increase amLODIPine (NORVASC) 10 MG tablet; Take 1 tablet (10 mg total) by mouth once daily.  Dispense: 90 tablet; Refill: 3  -     losartan (COZAAR) 50 MG tablet; Take 1 tablet (50 mg total) by mouth once daily.  Dispense: 90 tablet; Refill: 3  -     Educated patient to take medications as prescribed, and to record bp logs daily to bring along to next visit. Instructed patient to decrease salt intake. Also told patient to call if any new signs or symptoms develop.    Hypothyroidism, unspecified type  -     levothyroxine (SYNTHROID) 50 MCG tablet; Take 1 tablet (50 mcg total) by mouth once daily.  Dispense: 90 tablet; Refill: 3  -     Counseled patient about healthy diet, exercise habits, and to increase physical activity.    Routine physical examination  -     CBC auto differential; Future  -     Comprehensive Metabolic Panel; Future  -     Hemoglobin A1C; Future  -     Lipid Panel; Future  -     TSH; Future  -     T4, Free; Future    Encounter for screening mammogram for breast cancer  -     Mammo Digital Screening Bilat; Future; Expected date:  10/05/2020    Need for shingles vaccine  -     Zoster Recombinant Vaccine      RTC PRN    Bobby Allen MD  10/05/2020 2:54 PM

## 2020-10-06 ENCOUNTER — LAB VISIT (OUTPATIENT)
Dept: LAB | Facility: HOSPITAL | Age: 55
End: 2020-10-06
Attending: FAMILY MEDICINE
Payer: COMMERCIAL

## 2020-10-06 DIAGNOSIS — Z00.00 ROUTINE PHYSICAL EXAMINATION: ICD-10-CM

## 2020-10-06 LAB
ALBUMIN SERPL BCP-MCNC: 4 G/DL (ref 3.5–5.2)
ALP SERPL-CCNC: 128 U/L (ref 55–135)
ALT SERPL W/O P-5'-P-CCNC: 28 U/L (ref 10–44)
ANION GAP SERPL CALC-SCNC: 9 MMOL/L (ref 8–16)
AST SERPL-CCNC: 27 U/L (ref 10–40)
BASOPHILS # BLD AUTO: 0.03 K/UL (ref 0–0.2)
BASOPHILS NFR BLD: 0.4 % (ref 0–1.9)
BILIRUB SERPL-MCNC: 0.4 MG/DL (ref 0.1–1)
BUN SERPL-MCNC: 7 MG/DL (ref 6–20)
CALCIUM SERPL-MCNC: 9.5 MG/DL (ref 8.7–10.5)
CHLORIDE SERPL-SCNC: 101 MMOL/L (ref 95–110)
CHOLEST SERPL-MCNC: 211 MG/DL (ref 120–199)
CHOLEST/HDLC SERPL: 4 {RATIO} (ref 2–5)
CO2 SERPL-SCNC: 29 MMOL/L (ref 23–29)
CREAT SERPL-MCNC: 0.8 MG/DL (ref 0.5–1.4)
DIFFERENTIAL METHOD: ABNORMAL
EOSINOPHIL # BLD AUTO: 0.3 K/UL (ref 0–0.5)
EOSINOPHIL NFR BLD: 3.1 % (ref 0–8)
ERYTHROCYTE [DISTWIDTH] IN BLOOD BY AUTOMATED COUNT: 12.3 % (ref 11.5–14.5)
EST. GFR  (AFRICAN AMERICAN): >60 ML/MIN/1.73 M^2
EST. GFR  (NON AFRICAN AMERICAN): >60 ML/MIN/1.73 M^2
GLUCOSE SERPL-MCNC: 83 MG/DL (ref 70–110)
HCT VFR BLD AUTO: 40.4 % (ref 37–48.5)
HDLC SERPL-MCNC: 53 MG/DL (ref 40–75)
HDLC SERPL: 25.1 % (ref 20–50)
HGB BLD-MCNC: 12.7 G/DL (ref 12–16)
IMM GRANULOCYTES # BLD AUTO: 0.02 K/UL (ref 0–0.04)
IMM GRANULOCYTES NFR BLD AUTO: 0.2 % (ref 0–0.5)
LDLC SERPL CALC-MCNC: 126.6 MG/DL (ref 63–159)
LYMPHOCYTES # BLD AUTO: 1.6 K/UL (ref 1–4.8)
LYMPHOCYTES NFR BLD: 20 % (ref 18–48)
MCH RBC QN AUTO: 30 PG (ref 27–31)
MCHC RBC AUTO-ENTMCNC: 31.4 G/DL (ref 32–36)
MCV RBC AUTO: 95 FL (ref 82–98)
MONOCYTES # BLD AUTO: 0.7 K/UL (ref 0.3–1)
MONOCYTES NFR BLD: 8 % (ref 4–15)
NEUTROPHILS # BLD AUTO: 5.5 K/UL (ref 1.8–7.7)
NEUTROPHILS NFR BLD: 68.3 % (ref 38–73)
NONHDLC SERPL-MCNC: 158 MG/DL
NRBC BLD-RTO: 0 /100 WBC
PLATELET # BLD AUTO: 324 K/UL (ref 150–350)
PMV BLD AUTO: 10 FL (ref 9.2–12.9)
POTASSIUM SERPL-SCNC: 3.9 MMOL/L (ref 3.5–5.1)
PROT SERPL-MCNC: 7.7 G/DL (ref 6–8.4)
RBC # BLD AUTO: 4.24 M/UL (ref 4–5.4)
SODIUM SERPL-SCNC: 139 MMOL/L (ref 136–145)
T4 FREE SERPL-MCNC: 1.08 NG/DL (ref 0.71–1.51)
TRIGL SERPL-MCNC: 157 MG/DL (ref 30–150)
TSH SERPL DL<=0.005 MIU/L-ACNC: 0.93 UIU/ML (ref 0.4–4)
WBC # BLD AUTO: 8.11 K/UL (ref 3.9–12.7)

## 2020-10-06 PROCEDURE — 84443 ASSAY THYROID STIM HORMONE: CPT

## 2020-10-06 PROCEDURE — 83036 HEMOGLOBIN GLYCOSYLATED A1C: CPT

## 2020-10-06 PROCEDURE — 80053 COMPREHEN METABOLIC PANEL: CPT

## 2020-10-06 PROCEDURE — 36415 COLL VENOUS BLD VENIPUNCTURE: CPT | Mod: PO

## 2020-10-06 PROCEDURE — 85025 COMPLETE CBC W/AUTO DIFF WBC: CPT

## 2020-10-06 PROCEDURE — 84439 ASSAY OF FREE THYROXINE: CPT

## 2020-10-06 PROCEDURE — 80061 LIPID PANEL: CPT

## 2020-10-07 LAB
ESTIMATED AVG GLUCOSE: 108 MG/DL (ref 68–131)
HBA1C MFR BLD HPLC: 5.4 % (ref 4–5.6)

## 2020-10-14 ENCOUNTER — TELEPHONE (OUTPATIENT)
Dept: OBSTETRICS AND GYNECOLOGY | Facility: CLINIC | Age: 55
End: 2020-10-14

## 2020-10-14 DIAGNOSIS — Z12.31 ENCOUNTER FOR SCREENING MAMMOGRAM FOR MALIGNANT NEOPLASM OF BREAST: Primary | ICD-10-CM

## 2021-01-05 ENCOUNTER — PATIENT MESSAGE (OUTPATIENT)
Dept: ADMINISTRATIVE | Facility: HOSPITAL | Age: 56
End: 2021-01-05

## 2021-04-06 ENCOUNTER — PATIENT MESSAGE (OUTPATIENT)
Dept: ADMINISTRATIVE | Facility: HOSPITAL | Age: 56
End: 2021-04-06

## 2021-07-06 ENCOUNTER — PATIENT MESSAGE (OUTPATIENT)
Dept: ADMINISTRATIVE | Facility: HOSPITAL | Age: 56
End: 2021-07-06

## 2021-09-17 ENCOUNTER — OFFICE VISIT (OUTPATIENT)
Dept: FAMILY MEDICINE | Facility: CLINIC | Age: 56
End: 2021-09-17
Payer: COMMERCIAL

## 2021-09-17 VITALS
HEIGHT: 62 IN | BODY MASS INDEX: 28.11 KG/M2 | SYSTOLIC BLOOD PRESSURE: 144 MMHG | DIASTOLIC BLOOD PRESSURE: 82 MMHG | TEMPERATURE: 98 F | OXYGEN SATURATION: 98 % | WEIGHT: 152.75 LBS | HEART RATE: 88 BPM

## 2021-09-17 DIAGNOSIS — I10 ESSENTIAL HYPERTENSION, BENIGN: Chronic | ICD-10-CM

## 2021-09-17 DIAGNOSIS — Z12.31 ENCOUNTER FOR SCREENING MAMMOGRAM FOR BREAST CANCER: ICD-10-CM

## 2021-09-17 DIAGNOSIS — Z00.00 ROUTINE MEDICAL EXAM: Primary | ICD-10-CM

## 2021-09-17 DIAGNOSIS — E03.9 HYPOTHYROIDISM, UNSPECIFIED TYPE: ICD-10-CM

## 2021-09-17 PROCEDURE — 3079F DIAST BP 80-89 MM HG: CPT | Mod: CPTII,S$GLB,, | Performed by: INTERNAL MEDICINE

## 2021-09-17 PROCEDURE — 4010F PR ACE/ARB THEARPY RXD/TAKEN: ICD-10-PCS | Mod: CPTII,S$GLB,, | Performed by: INTERNAL MEDICINE

## 2021-09-17 PROCEDURE — 99999 PR PBB SHADOW E&M-EST. PATIENT-LVL III: CPT | Mod: PBBFAC,,, | Performed by: INTERNAL MEDICINE

## 2021-09-17 PROCEDURE — 1160F PR REVIEW ALL MEDS BY PRESCRIBER/CLIN PHARMACIST DOCUMENTED: ICD-10-PCS | Mod: CPTII,S$GLB,, | Performed by: INTERNAL MEDICINE

## 2021-09-17 PROCEDURE — 3008F BODY MASS INDEX DOCD: CPT | Mod: CPTII,S$GLB,, | Performed by: INTERNAL MEDICINE

## 2021-09-17 PROCEDURE — 1159F PR MEDICATION LIST DOCUMENTED IN MEDICAL RECORD: ICD-10-PCS | Mod: CPTII,S$GLB,, | Performed by: INTERNAL MEDICINE

## 2021-09-17 PROCEDURE — 3077F SYST BP >= 140 MM HG: CPT | Mod: CPTII,S$GLB,, | Performed by: INTERNAL MEDICINE

## 2021-09-17 PROCEDURE — 3079F PR MOST RECENT DIASTOLIC BLOOD PRESSURE 80-89 MM HG: ICD-10-PCS | Mod: CPTII,S$GLB,, | Performed by: INTERNAL MEDICINE

## 2021-09-17 PROCEDURE — 3008F PR BODY MASS INDEX (BMI) DOCUMENTED: ICD-10-PCS | Mod: CPTII,S$GLB,, | Performed by: INTERNAL MEDICINE

## 2021-09-17 PROCEDURE — 4010F ACE/ARB THERAPY RXD/TAKEN: CPT | Mod: CPTII,S$GLB,, | Performed by: INTERNAL MEDICINE

## 2021-09-17 PROCEDURE — 1159F MED LIST DOCD IN RCRD: CPT | Mod: CPTII,S$GLB,, | Performed by: INTERNAL MEDICINE

## 2021-09-17 PROCEDURE — 99396 PR PREVENTIVE VISIT,EST,40-64: ICD-10-PCS | Mod: S$GLB,,, | Performed by: INTERNAL MEDICINE

## 2021-09-17 PROCEDURE — 99999 PR PBB SHADOW E&M-EST. PATIENT-LVL III: ICD-10-PCS | Mod: PBBFAC,,, | Performed by: INTERNAL MEDICINE

## 2021-09-17 PROCEDURE — 3077F PR MOST RECENT SYSTOLIC BLOOD PRESSURE >= 140 MM HG: ICD-10-PCS | Mod: CPTII,S$GLB,, | Performed by: INTERNAL MEDICINE

## 2021-09-17 PROCEDURE — 99396 PREV VISIT EST AGE 40-64: CPT | Mod: S$GLB,,, | Performed by: INTERNAL MEDICINE

## 2021-09-17 PROCEDURE — 1160F RVW MEDS BY RX/DR IN RCRD: CPT | Mod: CPTII,S$GLB,, | Performed by: INTERNAL MEDICINE

## 2021-09-17 RX ORDER — LEVOTHYROXINE SODIUM 50 UG/1
50 TABLET ORAL DAILY
Qty: 90 TABLET | Refills: 3 | Status: SHIPPED | OUTPATIENT
Start: 2021-09-17 | End: 2022-08-16 | Stop reason: SDUPTHER

## 2021-09-17 RX ORDER — LOSARTAN POTASSIUM 100 MG/1
100 TABLET ORAL DAILY
Qty: 90 TABLET | Refills: 3 | Status: SHIPPED | OUTPATIENT
Start: 2021-09-17 | End: 2024-02-15 | Stop reason: SDUPTHER

## 2021-09-17 RX ORDER — LOSARTAN POTASSIUM 50 MG/1
50 TABLET ORAL DAILY
Qty: 90 TABLET | Refills: 3 | Status: SHIPPED | OUTPATIENT
Start: 2021-09-17 | End: 2021-09-17

## 2021-09-17 RX ORDER — AMLODIPINE BESYLATE 5 MG/1
5 TABLET ORAL DAILY
Qty: 90 TABLET | Refills: 3 | Status: SHIPPED | OUTPATIENT
Start: 2021-09-17 | End: 2021-11-22

## 2021-09-17 RX ORDER — AMLODIPINE BESYLATE 10 MG/1
10 TABLET ORAL DAILY
Qty: 90 TABLET | Refills: 3 | Status: SHIPPED | OUTPATIENT
Start: 2021-09-17 | End: 2021-09-17

## 2021-10-19 ENCOUNTER — TELEPHONE (OUTPATIENT)
Dept: FAMILY MEDICINE | Facility: CLINIC | Age: 56
End: 2021-10-19

## 2021-11-22 ENCOUNTER — CLINICAL SUPPORT (OUTPATIENT)
Dept: FAMILY MEDICINE | Facility: CLINIC | Age: 56
End: 2021-11-22
Payer: COMMERCIAL

## 2021-11-22 VITALS
OXYGEN SATURATION: 98 % | TEMPERATURE: 98 F | SYSTOLIC BLOOD PRESSURE: 154 MMHG | HEART RATE: 90 BPM | DIASTOLIC BLOOD PRESSURE: 70 MMHG

## 2021-11-22 DIAGNOSIS — Z23 NEED FOR SHINGLES VACCINE: ICD-10-CM

## 2021-11-22 DIAGNOSIS — I10 ESSENTIAL HYPERTENSION, BENIGN: Primary | Chronic | ICD-10-CM

## 2021-11-22 PROCEDURE — 99499 NO LOS: ICD-10-PCS | Mod: S$GLB,,, | Performed by: FAMILY MEDICINE

## 2021-11-22 PROCEDURE — 90750 ZOSTER RECOMBINANT VACCINE: ICD-10-PCS | Mod: S$GLB,,, | Performed by: FAMILY MEDICINE

## 2021-11-22 PROCEDURE — 90471 ZOSTER RECOMBINANT VACCINE: ICD-10-PCS | Mod: S$GLB,,, | Performed by: FAMILY MEDICINE

## 2021-11-22 PROCEDURE — 99499 UNLISTED E&M SERVICE: CPT | Mod: S$GLB,,, | Performed by: FAMILY MEDICINE

## 2021-11-22 PROCEDURE — 99999 PR PBB SHADOW E&M-EST. PATIENT-LVL III: CPT | Mod: PBBFAC,,,

## 2021-11-22 PROCEDURE — 90471 IMMUNIZATION ADMIN: CPT | Mod: S$GLB,,, | Performed by: FAMILY MEDICINE

## 2021-11-22 PROCEDURE — 90750 HZV VACC RECOMBINANT IM: CPT | Mod: S$GLB,,, | Performed by: FAMILY MEDICINE

## 2021-11-22 PROCEDURE — 99999 PR PBB SHADOW E&M-EST. PATIENT-LVL III: ICD-10-PCS | Mod: PBBFAC,,,

## 2021-11-22 RX ORDER — AMLODIPINE BESYLATE 10 MG/1
10 TABLET ORAL DAILY
Qty: 90 TABLET | Refills: 3 | Status: SHIPPED | OUTPATIENT
Start: 2021-11-22 | End: 2022-08-16 | Stop reason: SDUPTHER

## 2021-11-23 ENCOUNTER — LAB VISIT (OUTPATIENT)
Dept: LAB | Facility: HOSPITAL | Age: 56
End: 2021-11-23
Attending: INTERNAL MEDICINE
Payer: COMMERCIAL

## 2021-11-23 DIAGNOSIS — E03.9 HYPOTHYROIDISM, UNSPECIFIED TYPE: ICD-10-CM

## 2021-11-23 DIAGNOSIS — Z00.00 ROUTINE MEDICAL EXAM: ICD-10-CM

## 2021-11-23 LAB
ALBUMIN SERPL BCP-MCNC: 4 G/DL (ref 3.5–5.2)
ALP SERPL-CCNC: 128 U/L (ref 55–135)
ALT SERPL W/O P-5'-P-CCNC: 30 U/L (ref 10–44)
ANION GAP SERPL CALC-SCNC: 10 MMOL/L (ref 8–16)
AST SERPL-CCNC: 29 U/L (ref 10–40)
BASOPHILS # BLD AUTO: 0.03 K/UL (ref 0–0.2)
BASOPHILS NFR BLD: 0.4 % (ref 0–1.9)
BILIRUB SERPL-MCNC: 0.6 MG/DL (ref 0.1–1)
BUN SERPL-MCNC: 10 MG/DL (ref 6–20)
CALCIUM SERPL-MCNC: 9.9 MG/DL (ref 8.7–10.5)
CHLORIDE SERPL-SCNC: 102 MMOL/L (ref 95–110)
CHOLEST SERPL-MCNC: 201 MG/DL (ref 120–199)
CHOLEST/HDLC SERPL: 3.9 {RATIO} (ref 2–5)
CO2 SERPL-SCNC: 27 MMOL/L (ref 23–29)
CREAT SERPL-MCNC: 0.7 MG/DL (ref 0.5–1.4)
DIFFERENTIAL METHOD: ABNORMAL
EOSINOPHIL # BLD AUTO: 0.1 K/UL (ref 0–0.5)
EOSINOPHIL NFR BLD: 1.3 % (ref 0–8)
ERYTHROCYTE [DISTWIDTH] IN BLOOD BY AUTOMATED COUNT: 12.4 % (ref 11.5–14.5)
EST. GFR  (AFRICAN AMERICAN): >60 ML/MIN/1.73 M^2
EST. GFR  (NON AFRICAN AMERICAN): >60 ML/MIN/1.73 M^2
GLUCOSE SERPL-MCNC: 88 MG/DL (ref 70–110)
HCT VFR BLD AUTO: 36.7 % (ref 37–48.5)
HDLC SERPL-MCNC: 51 MG/DL (ref 40–75)
HDLC SERPL: 25.4 % (ref 20–50)
HGB BLD-MCNC: 11.9 G/DL (ref 12–16)
IMM GRANULOCYTES # BLD AUTO: 0.01 K/UL (ref 0–0.04)
IMM GRANULOCYTES NFR BLD AUTO: 0.1 % (ref 0–0.5)
LDLC SERPL CALC-MCNC: 112.4 MG/DL (ref 63–159)
LYMPHOCYTES # BLD AUTO: 0.8 K/UL (ref 1–4.8)
LYMPHOCYTES NFR BLD: 10.7 % (ref 18–48)
MCH RBC QN AUTO: 29.2 PG (ref 27–31)
MCHC RBC AUTO-ENTMCNC: 32.4 G/DL (ref 32–36)
MCV RBC AUTO: 90 FL (ref 82–98)
MONOCYTES # BLD AUTO: 0.5 K/UL (ref 0.3–1)
MONOCYTES NFR BLD: 7 % (ref 4–15)
NEUTROPHILS # BLD AUTO: 5.7 K/UL (ref 1.8–7.7)
NEUTROPHILS NFR BLD: 80.5 % (ref 38–73)
NONHDLC SERPL-MCNC: 150 MG/DL
NRBC BLD-RTO: 0 /100 WBC
PLATELET # BLD AUTO: 342 K/UL (ref 150–450)
PMV BLD AUTO: 9.7 FL (ref 9.2–12.9)
POTASSIUM SERPL-SCNC: 4.4 MMOL/L (ref 3.5–5.1)
PROT SERPL-MCNC: 7.6 G/DL (ref 6–8.4)
RBC # BLD AUTO: 4.07 M/UL (ref 4–5.4)
SODIUM SERPL-SCNC: 139 MMOL/L (ref 136–145)
TRIGL SERPL-MCNC: 188 MG/DL (ref 30–150)
TSH SERPL DL<=0.005 MIU/L-ACNC: 1.09 UIU/ML (ref 0.4–4)
WBC # BLD AUTO: 7.04 K/UL (ref 3.9–12.7)

## 2021-11-23 PROCEDURE — 36415 COLL VENOUS BLD VENIPUNCTURE: CPT | Mod: PO | Performed by: INTERNAL MEDICINE

## 2021-11-23 PROCEDURE — 84443 ASSAY THYROID STIM HORMONE: CPT | Performed by: INTERNAL MEDICINE

## 2021-11-23 PROCEDURE — 80053 COMPREHEN METABOLIC PANEL: CPT | Performed by: INTERNAL MEDICINE

## 2021-11-23 PROCEDURE — 85025 COMPLETE CBC W/AUTO DIFF WBC: CPT | Performed by: INTERNAL MEDICINE

## 2021-11-23 PROCEDURE — 80061 LIPID PANEL: CPT | Performed by: INTERNAL MEDICINE

## 2021-12-08 ENCOUNTER — PATIENT MESSAGE (OUTPATIENT)
Dept: ADMINISTRATIVE | Facility: HOSPITAL | Age: 56
End: 2021-12-08
Payer: COMMERCIAL

## 2021-12-10 ENCOUNTER — HOSPITAL ENCOUNTER (OUTPATIENT)
Dept: RADIOLOGY | Facility: HOSPITAL | Age: 56
Discharge: HOME | End: 2021-12-10
Attending: INTERNAL MEDICINE
Payer: COMMERCIAL

## 2021-12-10 DIAGNOSIS — Z12.31 ENCOUNTER FOR SCREENING MAMMOGRAM FOR BREAST CANCER: ICD-10-CM

## 2021-12-10 PROCEDURE — 77063 BREAST TOMOSYNTHESIS BI: CPT | Mod: 26,,, | Performed by: RADIOLOGY

## 2021-12-10 PROCEDURE — 77067 SCR MAMMO BI INCL CAD: CPT | Mod: TC,PO

## 2021-12-10 PROCEDURE — 77063 MAMMO DIGITAL SCREENING BILAT WITH TOMO: ICD-10-PCS | Mod: 26,,, | Performed by: RADIOLOGY

## 2021-12-10 PROCEDURE — 77067 MAMMO DIGITAL SCREENING BILAT WITH TOMO: ICD-10-PCS | Mod: 26,,, | Performed by: RADIOLOGY

## 2021-12-10 PROCEDURE — 77067 SCR MAMMO BI INCL CAD: CPT | Mod: 26,,, | Performed by: RADIOLOGY

## 2021-12-15 ENCOUNTER — PATIENT MESSAGE (OUTPATIENT)
Dept: ADMINISTRATIVE | Facility: HOSPITAL | Age: 56
End: 2021-12-15
Payer: COMMERCIAL

## 2022-04-01 ENCOUNTER — OFFICE VISIT (OUTPATIENT)
Dept: FAMILY MEDICINE | Facility: CLINIC | Age: 57
End: 2022-04-01
Payer: COMMERCIAL

## 2022-04-01 VITALS
HEIGHT: 62 IN | SYSTOLIC BLOOD PRESSURE: 138 MMHG | OXYGEN SATURATION: 97 % | BODY MASS INDEX: 28.55 KG/M2 | WEIGHT: 155.13 LBS | HEART RATE: 86 BPM | DIASTOLIC BLOOD PRESSURE: 72 MMHG | TEMPERATURE: 97 F

## 2022-04-01 DIAGNOSIS — K21.9 GASTROESOPHAGEAL REFLUX DISEASE, UNSPECIFIED WHETHER ESOPHAGITIS PRESENT: ICD-10-CM

## 2022-04-01 DIAGNOSIS — I10 ESSENTIAL HYPERTENSION, BENIGN: Chronic | ICD-10-CM

## 2022-04-01 DIAGNOSIS — E03.9 HYPOTHYROIDISM, UNSPECIFIED TYPE: Chronic | ICD-10-CM

## 2022-04-01 DIAGNOSIS — M67.449 DIGITAL MUCINOUS CYST OF FINGER: Primary | ICD-10-CM

## 2022-04-01 PROCEDURE — 99214 OFFICE O/P EST MOD 30 MIN: CPT | Mod: S$GLB,,, | Performed by: NURSE PRACTITIONER

## 2022-04-01 PROCEDURE — 4010F ACE/ARB THERAPY RXD/TAKEN: CPT | Mod: CPTII,S$GLB,, | Performed by: NURSE PRACTITIONER

## 2022-04-01 PROCEDURE — 3078F DIAST BP <80 MM HG: CPT | Mod: CPTII,S$GLB,, | Performed by: NURSE PRACTITIONER

## 2022-04-01 PROCEDURE — 1159F PR MEDICATION LIST DOCUMENTED IN MEDICAL RECORD: ICD-10-PCS | Mod: CPTII,S$GLB,, | Performed by: NURSE PRACTITIONER

## 2022-04-01 PROCEDURE — 3075F SYST BP GE 130 - 139MM HG: CPT | Mod: CPTII,S$GLB,, | Performed by: NURSE PRACTITIONER

## 2022-04-01 PROCEDURE — 99999 PR PBB SHADOW E&M-EST. PATIENT-LVL V: CPT | Mod: PBBFAC,,, | Performed by: NURSE PRACTITIONER

## 2022-04-01 PROCEDURE — 4010F PR ACE/ARB THEARPY RXD/TAKEN: ICD-10-PCS | Mod: CPTII,S$GLB,, | Performed by: NURSE PRACTITIONER

## 2022-04-01 PROCEDURE — 99999 PR PBB SHADOW E&M-EST. PATIENT-LVL V: ICD-10-PCS | Mod: PBBFAC,,, | Performed by: NURSE PRACTITIONER

## 2022-04-01 PROCEDURE — 1159F MED LIST DOCD IN RCRD: CPT | Mod: CPTII,S$GLB,, | Performed by: NURSE PRACTITIONER

## 2022-04-01 PROCEDURE — 1160F PR REVIEW ALL MEDS BY PRESCRIBER/CLIN PHARMACIST DOCUMENTED: ICD-10-PCS | Mod: CPTII,S$GLB,, | Performed by: NURSE PRACTITIONER

## 2022-04-01 PROCEDURE — 3075F PR MOST RECENT SYSTOLIC BLOOD PRESS GE 130-139MM HG: ICD-10-PCS | Mod: CPTII,S$GLB,, | Performed by: NURSE PRACTITIONER

## 2022-04-01 PROCEDURE — 99214 PR OFFICE/OUTPT VISIT, EST, LEVL IV, 30-39 MIN: ICD-10-PCS | Mod: S$GLB,,, | Performed by: NURSE PRACTITIONER

## 2022-04-01 PROCEDURE — 3008F BODY MASS INDEX DOCD: CPT | Mod: CPTII,S$GLB,, | Performed by: NURSE PRACTITIONER

## 2022-04-01 PROCEDURE — 3078F PR MOST RECENT DIASTOLIC BLOOD PRESSURE < 80 MM HG: ICD-10-PCS | Mod: CPTII,S$GLB,, | Performed by: NURSE PRACTITIONER

## 2022-04-01 PROCEDURE — 1160F RVW MEDS BY RX/DR IN RCRD: CPT | Mod: CPTII,S$GLB,, | Performed by: NURSE PRACTITIONER

## 2022-04-01 PROCEDURE — 3008F PR BODY MASS INDEX (BMI) DOCUMENTED: ICD-10-PCS | Mod: CPTII,S$GLB,, | Performed by: NURSE PRACTITIONER

## 2022-04-01 RX ORDER — ESOMEPRAZOLE MAGNESIUM 40 MG/1
40 CAPSULE, DELAYED RELEASE ORAL
Qty: 90 CAPSULE | Refills: 3 | Status: SHIPPED | OUTPATIENT
Start: 2022-04-01 | End: 2024-02-15

## 2022-04-01 NOTE — PROGRESS NOTES
Chief Complaint  Chief Complaint   Patient presents with    Hand Pain     Right hand       HPI  Anusha Carranza is a 57 y.o. female with medical diagnoses as listed in the medical history and problem list that presents for Digital mucinous cyst of right hand middle finger.  This patient is new to me.     1. Digital mucinous cyst of right hand, middle finger x 6 days: Reports limited ROM at right hand cyst appeared. Denies hand trauma. She rates pain at finger 5/10. She has been using Aleve or Tylenol for pain. She has trigger finger at that same hand.       PAST MEDICAL HISTORY:  Past Medical History:   Diagnosis Date    Hypertension        PAST SURGICAL HISTORY:  Past Surgical History:   Procedure Laterality Date    CHOLECYSTECTOMY      EYE SURGERY Bilateral 2009    HYSTERECTOMY  2002    [facility]   ([facility])        SOCIAL HISTORY:  Social History     Socioeconomic History    Marital status:    Tobacco Use    Smoking status: Never Smoker    Smokeless tobacco: Never Used   Substance and Sexual Activity    Alcohol use: Yes     Comment: Social     Drug use: No    Sexual activity: Yes     Partners: Male     Birth control/protection: Surgical     Comment: /Engaged:     ([facility]  2002)       FAMILY HISTORY:  Family History   Problem Relation Age of Onset    Heart disease Mother     Diabetes Mother     Stroke Neg Hx     Breast cancer Neg Hx     Colon cancer Neg Hx     Ovarian cancer Neg Hx        ALLERGIES AND MEDICATIONS: updated and reviewed.  Review of patient's allergies indicates:   Allergen Reactions    Benadryl [diphenhydramine hcl] Other (See Comments)     Increases Heart Rate     Pcn [penicillins] Swelling     Current Outpatient Medications   Medication Sig Dispense Refill    amLODIPine (NORVASC) 10 MG tablet Take 1 tablet (10 mg total) by mouth once daily. Increased dose 90 tablet 3    levothyroxine (SYNTHROID) 50 MCG tablet Take 1 tablet (50 mcg total) by mouth once daily. 90  "tablet 3    losartan (COZAAR) 100 MG tablet Take 1 tablet (100 mg total) by mouth once daily. 90 tablet 3    azelastine (ASTELIN) 137 mcg (0.1 %) nasal spray 1 spray (137 mcg total) by Nasal route 2 (two) times daily. (Patient not taking: Reported on 4/1/2022) 30 mL 0    cetirizine (ZYRTEC) 10 MG tablet Take 1 tablet (10 mg total) by mouth once daily. (Patient not taking: Reported on 4/1/2022)  0    conjugated estrogens (PREMARIN) vaginal cream Apply pea sized amount vaginally nightly x 2 weeks, then every other night x 2 weeks, then two times per week x 2 weeks, then weekly. (Patient not taking: No sig reported) 30 g 4    diclofenac sodium (VOLTAREN) 1 % Gel Apply 2 g topically 3 (three) times daily. (Patient not taking: No sig reported) 100 g 0    esomeprazole (NEXIUM) 40 MG capsule Take 1 capsule (40 mg total) by mouth before breakfast. 90 capsule 3    mupirocin (BACTROBAN) 2 % ointment Apply topically 3 (three) times daily. (Patient not taking: No sig reported) 30 g 0     No current facility-administered medications for this visit.         ROS  Review of Systems   Constitutional: Negative for appetite change, chills, fatigue and fever.   HENT: Negative for congestion, ear pain, postnasal drip, rhinorrhea, sinus pressure, sneezing and sore throat.    Respiratory: Negative for shortness of breath.    Cardiovascular: Negative for chest pain and palpitations.   Gastrointestinal: Negative for abdominal pain, constipation, diarrhea, nausea and vomiting.   Genitourinary: Negative for dysuria.   Musculoskeletal: Positive for joint swelling. Negative for arthralgias.   Neurological: Negative for headaches.   Psychiatric/Behavioral: Negative for sleep disturbance.           Physical Exam  Vitals:    04/01/22 1356   BP: 138/72   Pulse: 86   Temp: 97.3 °F (36.3 °C)   TempSrc: Oral   SpO2: 97%   Weight: 70.4 kg (155 lb 1.5 oz)   Height: 5' 2" (1.575 m)    Body mass index is 28.37 kg/m².  Weight: 70.4 kg (155 lb 1.5 " "oz)   Height: 5' 2" (157.5 cm)   Physical Exam  Constitutional:       General: She is not in acute distress.     Appearance: Normal appearance.   HENT:      Head: Normocephalic and atraumatic.      Right Ear: External ear normal.      Left Ear: External ear normal.      Nose: Nose normal.   Eyes:      Pupils: Pupils are equal, round, and reactive to light.   Cardiovascular:      Rate and Rhythm: Normal rate and regular rhythm.      Pulses: Normal pulses.   Pulmonary:      Effort: Pulmonary effort is normal. No respiratory distress.      Breath sounds: Normal breath sounds. No wheezing.   Abdominal:      General: Bowel sounds are normal.      Palpations: Abdomen is soft.   Musculoskeletal:      Right hand: Swelling and tenderness present. Decreased range of motion.      Cervical back: Neck supple.   Lymphadenopathy:      Cervical: No cervical adenopathy.   Skin:     General: Skin is warm and dry.      Capillary Refill: Capillary refill takes less than 2 seconds.   Neurological:      General: No focal deficit present.      Mental Status: She is alert and oriented to person, place, and time. Mental status is at baseline.   Psychiatric:         Mood and Affect: Mood normal.                 Health Maintenance       Date Due Completion Date    Mammogram 12/10/2022 12/10/2021    Colorectal Cancer Screening 01/01/2023 1/1/2013 (Done)    Override on 1/1/2013: Done    Lipid Panel 11/23/2026 11/23/2021    TETANUS VACCINE 05/19/2030 5/19/2020            Assessment & Plan  Problem List Items Addressed This Visit        Cardiac/Vascular    Essential hypertension, benign (Chronic)  -The current medical regimen is effective;  continue present plan and medications.        Endocrine    Hypothyroidism (Chronic)  -The current medical regimen is effective;  continue present plan and medications.       Other Visit Diagnoses     Digital mucinous cyst of finger    -  Primary  - Orthopedics as ordered for assessment of finger Cyst    " Relevant Orders    Ambulatory referral/consult to Orthopedics    Gastroesophageal reflux disease, unspecified whether esophagitis present      -We discussed proper administration of Nexium, adverse effects and side effects with education given for reinforcement  Advised not to eat or drink at least 2-3 hours before bed. Decrease spicy and fatty meals. Increase watre intake. Use NSAIDS sparingly. Sleep with head elevated on pillows (Do not sleep flat)    Relevant Medications    esomeprazole (NEXIUM) 40 MG capsule            Health Maintenance reviewed: None to address at this time    Follow-up: As needed

## 2022-08-16 DIAGNOSIS — E03.9 HYPOTHYROIDISM, UNSPECIFIED TYPE: ICD-10-CM

## 2022-08-16 DIAGNOSIS — I10 ESSENTIAL HYPERTENSION, BENIGN: Chronic | ICD-10-CM

## 2022-08-16 RX ORDER — AMLODIPINE BESYLATE 10 MG/1
10 TABLET ORAL DAILY
Qty: 90 TABLET | Refills: 0 | Status: SHIPPED | OUTPATIENT
Start: 2022-08-16 | End: 2024-02-15 | Stop reason: SDUPTHER

## 2022-08-16 RX ORDER — LEVOTHYROXINE SODIUM 50 UG/1
50 TABLET ORAL DAILY
Qty: 90 TABLET | Refills: 0 | Status: SHIPPED | OUTPATIENT
Start: 2022-08-16

## 2022-08-16 NOTE — TELEPHONE ENCOUNTER
----- Message from Mckenzie Butler sent at 8/16/2022  1:44 PM CDT -----  Type: RX Refill Request    Who Called: self     Have you contacted your pharmacy:     Refill or New Rx: refill     RX Name and Strength:  amLODIPine (NORVASC) 10 MG tablet  levothyroxine (SYNTHROID) 50 MCG tablet      How is the patient currently taking it? (ex. 1XDay):    Is this a 30 day or 90 day RX:    Preferred Pharmacy with phone number:        [facility]/pharmacy #[id number] - [place] - [address] [address]  [address]  [place] [address]  Phone: [phone] Fax: [phone]

## 2022-08-16 NOTE — TELEPHONE ENCOUNTER
Care Due:                  Date            Visit Type   Department     Provider  --------------------------------------------------------------------------------                                SAME DAY -   [facility]                              ESTABLISHED   MED/ INTERNAL  Last Visit: 09-      PATIENT      MED/ PEDS      Wilian Antonio  Next Visit: None Scheduled  None         None Found                                                            Last  Test          Frequency    Reason                     Performed    Due Date  --------------------------------------------------------------------------------    Office Visit  12 months..  amLODIPine,                09- 09-                             levothyroxine, losartan..    [facility] Embedded Care Gaps. Reference number: [id number]. 8/16/2022   1:53:42 PM CDT

## 2022-09-26 DIAGNOSIS — I10 ESSENTIAL HYPERTENSION, BENIGN: Chronic | ICD-10-CM

## 2022-09-26 NOTE — TELEPHONE ENCOUNTER
No new care gaps identified.  [facility] Embedded Care Gaps. Reference number: [id number]. 9/26/2022   1:25:59 PM CDT
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
## 2022-09-28 RX ORDER — LOSARTAN POTASSIUM 100 MG/1
100 TABLET ORAL DAILY
Qty: 90 TABLET | Refills: 3 | OUTPATIENT
Start: 2022-09-28

## 2022-11-22 DIAGNOSIS — E03.9 HYPOTHYROIDISM, UNSPECIFIED TYPE: ICD-10-CM

## 2022-11-22 RX ORDER — LEVOTHYROXINE SODIUM 50 UG/1
50 TABLET ORAL DAILY
Qty: 90 TABLET | Refills: 0 | OUTPATIENT
Start: 2022-11-22

## 2022-11-22 NOTE — TELEPHONE ENCOUNTER
No new care gaps identified.  [facility] Embedded Care Gaps. Reference number: [id number]. 11/22/2022   3:56:31 PM CST

## 2022-12-07 DIAGNOSIS — I10 ESSENTIAL HYPERTENSION, BENIGN: ICD-10-CM

## 2023-01-04 DIAGNOSIS — Z12.31 OTHER SCREENING MAMMOGRAM: ICD-10-CM

## 2023-01-09 ENCOUNTER — PATIENT MESSAGE (OUTPATIENT)
Dept: ADMINISTRATIVE | Facility: HOSPITAL | Age: 58
End: 2023-01-09
Payer: COMMERCIAL

## 2023-04-12 ENCOUNTER — PATIENT MESSAGE (OUTPATIENT)
Dept: ADMINISTRATIVE | Facility: HOSPITAL | Age: 58
End: 2023-04-12
Payer: COMMERCIAL

## 2023-05-18 ENCOUNTER — PATIENT MESSAGE (OUTPATIENT)
Dept: ADMINISTRATIVE | Facility: HOSPITAL | Age: 58
End: 2023-05-18
Payer: COMMERCIAL

## 2023-05-29 ENCOUNTER — PATIENT MESSAGE (OUTPATIENT)
Dept: ADMINISTRATIVE | Facility: HOSPITAL | Age: 58
End: 2023-05-29
Payer: COMMERCIAL

## 2023-06-08 ENCOUNTER — PATIENT OUTREACH (OUTPATIENT)
Dept: ADMINISTRATIVE | Facility: HOSPITAL | Age: 58
End: 2023-06-08
Payer: COMMERCIAL

## 2024-02-09 ENCOUNTER — TELEPHONE (OUTPATIENT)
Dept: FAMILY MEDICINE | Facility: CLINIC | Age: 59
End: 2024-02-09
Payer: COMMERCIAL

## 2024-02-09 DIAGNOSIS — Z00.00 ROUTINE PHYSICAL EXAMINATION: Primary | ICD-10-CM

## 2024-02-09 DIAGNOSIS — E03.9 HYPOTHYROIDISM, UNSPECIFIED TYPE: Primary | ICD-10-CM

## 2024-02-09 DIAGNOSIS — Z00.00 ROUTINE PHYSICAL EXAMINATION: ICD-10-CM

## 2024-02-09 NOTE — TELEPHONE ENCOUNTER
----- Message from Aashish Rubalcava sent at 2/9/2024 10:22 AM CST -----  Regarding: self  Type: Patient Call Back    Who called:self    What is the request in detail:pt is wanting to get blood work sent over before appointment on 2/15    Can the clinic reply by [facility]?no    Would the patient rather a call back or a response via [facility]? callback    Best call back number:[phone]    Additional Information:

## 2024-02-09 NOTE — TELEPHONE ENCOUNTER
Spoke with patient. Patient requesting more lab work. Patient was advised to wait until appointment with provider to discuss reason for labs. Patient verbalized understanding.

## 2024-02-15 ENCOUNTER — OFFICE VISIT (OUTPATIENT)
Dept: FAMILY MEDICINE | Facility: CLINIC | Age: 59
End: 2024-02-15
Payer: COMMERCIAL

## 2024-02-15 ENCOUNTER — TELEPHONE (OUTPATIENT)
Dept: FAMILY MEDICINE | Facility: CLINIC | Age: 59
End: 2024-02-15

## 2024-02-15 ENCOUNTER — LAB VISIT (OUTPATIENT)
Dept: LAB | Facility: HOSPITAL | Age: 59
End: 2024-02-15
Attending: FAMILY MEDICINE
Payer: COMMERCIAL

## 2024-02-15 VITALS
DIASTOLIC BLOOD PRESSURE: 78 MMHG | WEIGHT: 161.25 LBS | HEART RATE: 72 BPM | BODY MASS INDEX: 29.67 KG/M2 | OXYGEN SATURATION: 96 % | SYSTOLIC BLOOD PRESSURE: 139 MMHG | HEIGHT: 62 IN | TEMPERATURE: 98 F

## 2024-02-15 DIAGNOSIS — I10 ESSENTIAL HYPERTENSION, BENIGN: Chronic | ICD-10-CM

## 2024-02-15 DIAGNOSIS — Z12.31 ENCOUNTER FOR SCREENING MAMMOGRAM FOR BREAST CANCER: ICD-10-CM

## 2024-02-15 DIAGNOSIS — Z00.00 ROUTINE PHYSICAL EXAMINATION: ICD-10-CM

## 2024-02-15 DIAGNOSIS — E03.9 HYPOTHYROIDISM, UNSPECIFIED TYPE: Chronic | ICD-10-CM

## 2024-02-15 DIAGNOSIS — Z00.00 ROUTINE PHYSICAL EXAMINATION: Primary | ICD-10-CM

## 2024-02-15 DIAGNOSIS — Z12.11 ENCOUNTER FOR SCREENING FOR MALIGNANT NEOPLASM OF COLON: ICD-10-CM

## 2024-02-15 PROCEDURE — 80053 COMPREHEN METABOLIC PANEL: CPT | Performed by: FAMILY MEDICINE

## 2024-02-15 PROCEDURE — 82607 VITAMIN B-12: CPT | Performed by: FAMILY MEDICINE

## 2024-02-15 PROCEDURE — 82746 ASSAY OF FOLIC ACID SERUM: CPT | Performed by: FAMILY MEDICINE

## 2024-02-15 PROCEDURE — 3078F DIAST BP <80 MM HG: CPT | Mod: CPTII,S$GLB,, | Performed by: FAMILY MEDICINE

## 2024-02-15 PROCEDURE — 3008F BODY MASS INDEX DOCD: CPT | Mod: CPTII,S$GLB,, | Performed by: FAMILY MEDICINE

## 2024-02-15 PROCEDURE — 99396 PREV VISIT EST AGE 40-64: CPT | Mod: S$GLB,,, | Performed by: FAMILY MEDICINE

## 2024-02-15 PROCEDURE — 84439 ASSAY OF FREE THYROXINE: CPT | Performed by: FAMILY MEDICINE

## 2024-02-15 PROCEDURE — 36415 COLL VENOUS BLD VENIPUNCTURE: CPT | Mod: PO | Performed by: FAMILY MEDICINE

## 2024-02-15 PROCEDURE — 3075F SYST BP GE 130 - 139MM HG: CPT | Mod: CPTII,S$GLB,, | Performed by: FAMILY MEDICINE

## 2024-02-15 PROCEDURE — 82306 VITAMIN D 25 HYDROXY: CPT | Performed by: FAMILY MEDICINE

## 2024-02-15 PROCEDURE — 99999 PR PBB SHADOW E&M-EST. PATIENT-LVL III: CPT | Mod: PBBFAC,,, | Performed by: FAMILY MEDICINE

## 2024-02-15 PROCEDURE — 85025 COMPLETE CBC W/AUTO DIFF WBC: CPT | Performed by: FAMILY MEDICINE

## 2024-02-15 PROCEDURE — 83036 HEMOGLOBIN GLYCOSYLATED A1C: CPT | Performed by: FAMILY MEDICINE

## 2024-02-15 PROCEDURE — 84443 ASSAY THYROID STIM HORMONE: CPT | Performed by: FAMILY MEDICINE

## 2024-02-15 PROCEDURE — 99214 OFFICE O/P EST MOD 30 MIN: CPT | Mod: 25,S$GLB,, | Performed by: FAMILY MEDICINE

## 2024-02-15 PROCEDURE — 4010F ACE/ARB THERAPY RXD/TAKEN: CPT | Mod: CPTII,S$GLB,, | Performed by: FAMILY MEDICINE

## 2024-02-15 PROCEDURE — 80061 LIPID PANEL: CPT | Performed by: FAMILY MEDICINE

## 2024-02-15 RX ORDER — AMLODIPINE BESYLATE 10 MG/1
10 TABLET ORAL DAILY
Qty: 90 TABLET | Refills: 3 | Status: SHIPPED | OUTPATIENT
Start: 2024-02-15 | End: 2024-04-16

## 2024-02-15 RX ORDER — LOSARTAN POTASSIUM 100 MG/1
100 TABLET ORAL DAILY
Qty: 90 TABLET | Refills: 3 | Status: SHIPPED | OUTPATIENT
Start: 2024-02-15 | End: 2024-06-05 | Stop reason: ALTCHOICE

## 2024-02-15 NOTE — PROGRESS NOTES
[facility]  Progress Note    SUBJECTIVE:     Chief Complaint   Patient presents with    Annual Exam       HPI   Anusha Leon  is a 59 y.o. female here for physical exam. Also here for follow-up of her chronic conditions which will be addressed below. Patient has no other new complaints/problems at this time.      Review of patient's allergies indicates:   Allergen Reactions    Benadryl [diphenhydramine hcl] Other (See Comments)     Increases Heart Rate     Pcn [penicillins] Swelling       Past Medical History:   Diagnosis Date    Hypertension      Past Surgical History:   Procedure Laterality Date    CHOLECYSTECTOMY      EYE SURGERY Bilateral 2009    HYSTERECTOMY  2002    [facility]   ([facility])      Family History   Problem Relation Age of Onset    Heart disease Mother     Diabetes Mother     Stroke Neg Hx     Breast cancer Neg Hx     Colon cancer Neg Hx     Ovarian cancer Neg Hx      Social History     Tobacco Use    Smoking status: Never    Smokeless tobacco: Never   Substance Use Topics    Alcohol use: Yes     Comment: Social     Drug use: No        Review of Systems   Constitutional:  Negative for chills, diaphoresis and fever.   HENT:  Negative for congestion, ear pain and sore throat.    Eyes:  Negative for photophobia and discharge.   Respiratory:  Negative for cough, shortness of breath and wheezing.    Cardiovascular:  Negative for chest pain and palpitations.   Gastrointestinal:  Negative for abdominal pain, constipation, diarrhea, nausea and vomiting.   Genitourinary:  Negative for dysuria and hematuria.   Musculoskeletal:  Negative for back pain and myalgias.   Skin:  Negative for itching and rash.   Neurological:  Negative for dizziness, sensory change, focal weakness, weakness and headaches.   All other systems reviewed and are negative.    OBJECTIVE:     Vitals:    02/15/24 1404   BP: 139/78   Pulse: 72   Temp: 97.7 °F (36.5 °C)     Body mass index is 29.5 kg/m².    Physical  Exam  Constitutional:       General: She is not in acute distress.     Appearance: She is not diaphoretic.   HENT:      Head: Normocephalic and atraumatic.      Right Ear: Tympanic membrane and ear canal normal. No hemotympanum. Tympanic membrane is not perforated, erythematous or bulging.      Left Ear: Tympanic membrane and ear canal normal. No hemotympanum. Tympanic membrane is not perforated, erythematous or bulging.   Eyes:      Conjunctiva/sclera: Conjunctivae normal.      Pupils: Pupils are equal, round, and reactive to light.   Neck:      Thyroid: No thyromegaly.   Cardiovascular:      Rate and Rhythm: Normal rate and regular rhythm.      Heart sounds: Normal heart sounds. No murmur heard.     No friction rub. No gallop.   Pulmonary:      Effort: Pulmonary effort is normal. No respiratory distress.      Breath sounds: Normal breath sounds. No wheezing or rales.   Abdominal:      General: Bowel sounds are normal. There is no distension.      Palpations: Abdomen is soft.      Tenderness: There is no abdominal tenderness. There is no guarding or rebound.   Musculoskeletal:         General: No tenderness. Normal range of motion.   Skin:     General: Skin is warm.      Findings: No erythema or rash.   Neurological:      Mental Status: She is alert and oriented to person, place, and time.         Old records were reviewed. Labs and/or images were independently reviewed.    ASSESSMENT     1. Routine physical examination    2. Essential hypertension, benign    3. Hypothyroidism, unspecified type    4. Encounter for screening mammogram for breast cancer    5. Encounter for screening for malignant neoplasm of colon        PLAN:     Routine physical examination  -     Hemoglobin A1C; Future; Expected date: 02/15/2024  -     Lipid Panel; Future; Expected date: 02/15/2024  -     CBC Auto Differential; Future; Expected date: 02/15/2024  -     Comprehensive Metabolic Panel; Future  -     TSH; Future  -     T4, Free;  Future  -     Vitamin D; Future; Expected date: 02/15/2024  -     FOLATE; Future; Expected date: 02/15/2024  -     VITAMIN B12; Future; Expected date: 02/15/2024  -     We briefly discussed diet, exercise, and routine preventive exams. All questions and comments addressed.    Encounter for screening mammogram for breast cancer  -     Mammo Digital Screening Bilat; Future; Expected date: 02/15/2024    Encounter for screening for malignant neoplasm of colon  -     Cologuard Screening (Multitarget Stool DNA); Future; Expected date: 02/15/2024      RTC KYRA Allen MD  02/15/2024 2:19 PM    Additional Evaluation & Management issues:    In addition to today's Annual Physical, patient has other medical issues that need to be addressed, as well as their associated prescription management that is separate from today's physical, as documented separately below.     HPI  Patient here for f/u of her chronic conditions. Takes meds as directed. No chest pain, SOB, headaches. Has been in and out of [place] as she is moving. Patient has no other new complaints/problems at this time.      Review of Systems   Constitutional:  Negative for chills, diaphoresis and fever.   HENT:  Negative for congestion, ear pain and sore throat.    Eyes:  Negative for photophobia and discharge.   Respiratory:  Negative for cough, shortness of breath and wheezing.    Cardiovascular:  Negative for chest pain and palpitations.   Gastrointestinal:  Negative for abdominal pain, constipation, diarrhea, nausea and vomiting.   Genitourinary:  Negative for dysuria and hematuria.   Musculoskeletal:  Negative for back pain and myalgias.   Skin:  Negative for itching and rash.   Neurological:  Negative for dizziness, sensory change, focal weakness, weakness and headaches.   All other systems reviewed and are negative.    Physical Exam  Constitutional:       General: She is not in acute distress.     Appearance: She is not diaphoretic.   HENT:      Head:  Normocephalic and atraumatic.      Right Ear: Tympanic membrane and ear canal normal. No hemotympanum. Tympanic membrane is not perforated, erythematous or bulging.      Left Ear: Tympanic membrane and ear canal normal. No hemotympanum. Tympanic membrane is not perforated, erythematous or bulging.   Eyes:      Conjunctiva/sclera: Conjunctivae normal.      Pupils: Pupils are equal, round, and reactive to light.   Neck:      Thyroid: No thyromegaly.   Cardiovascular:      Rate and Rhythm: Normal rate and regular rhythm.      Heart sounds: Normal heart sounds. No murmur heard.     No friction rub. No gallop.   Pulmonary:      Effort: Pulmonary effort is normal. No respiratory distress.      Breath sounds: Normal breath sounds. No wheezing or rales.   Abdominal:      General: Bowel sounds are normal. There is no distension.      Palpations: Abdomen is soft.      Tenderness: There is no abdominal tenderness. There is no guarding or rebound.   Musculoskeletal:         General: No tenderness. Normal range of motion.   Skin:     General: Skin is warm.      Findings: No erythema or rash.   Neurological:      Mental Status: She is alert and oriented to person, place, and time.     Essential hypertension, benign  -     amLODIPine (NORVASC) 10 MG tablet; Take 1 tablet (10 mg total) by mouth once daily. Increased dose  Dispense: 90 tablet; Refill: 3  -     losartan (COZAAR) 100 MG tablet; Take 1 tablet (100 mg total) by mouth once daily.  Dispense: 90 tablet; Refill: 3  -     Educated patient to take medications as prescribed, and to record bp logs daily to bring along to next visit. Instructed patient to decrease salt intake. Also told patient to call if any new signs or symptoms develop.    Hypothyroidism, unspecified type   -     Stable. Continue current regimen.    RTC PRN

## 2024-02-15 NOTE — TELEPHONE ENCOUNTER
----- Message from Glen Baldemar sent at 2/15/2024  2:39 PM CST -----  Regarding: Self  [phone]  Type: Patient Call Back    Who called: Self     What is the request in detail:stated that she takes amLODIPine (NORVASC) 5 MG tablet instead of the 10mg, would like a call back to get that changes.      Can the clinic reply by [facility]? No     Would the patient rather a call back or a response via [facility]? Call back     Best call back number: [phone]     Additional Information:    Thank you.

## 2024-02-16 LAB
25(OH)D3+25(OH)D2 SERPL-MCNC: 21 NG/ML (ref 30–96)
ALBUMIN SERPL BCP-MCNC: 4.2 G/DL (ref 3.5–5.2)
ALP SERPL-CCNC: 113 U/L (ref 55–135)
ALT SERPL W/O P-5'-P-CCNC: 29 U/L (ref 10–44)
ANION GAP SERPL CALC-SCNC: 9 MMOL/L (ref 8–16)
AST SERPL-CCNC: 25 U/L (ref 10–40)
BASOPHILS # BLD AUTO: 0.02 K/UL (ref 0–0.2)
BASOPHILS NFR BLD: 0.3 % (ref 0–1.9)
BILIRUB SERPL-MCNC: 0.5 MG/DL (ref 0.1–1)
BUN SERPL-MCNC: 12 MG/DL (ref 8–23)
CALCIUM SERPL-MCNC: 10 MG/DL (ref 8.7–10.5)
CHLORIDE SERPL-SCNC: 105 MMOL/L (ref 95–110)
CHOLEST SERPL-MCNC: 244 MG/DL (ref 120–199)
CHOLEST/HDLC SERPL: 4.4 {RATIO} (ref 2–5)
CO2 SERPL-SCNC: 27 MMOL/L (ref 23–29)
CREAT SERPL-MCNC: 0.8 MG/DL (ref 0.5–1.4)
DIFFERENTIAL METHOD BLD: NORMAL
EOSINOPHIL # BLD AUTO: 0.1 K/UL (ref 0–0.5)
EOSINOPHIL NFR BLD: 1.4 % (ref 0–8)
ERYTHROCYTE [DISTWIDTH] IN BLOOD BY AUTOMATED COUNT: 12.5 % (ref 11.5–14.5)
EST. GFR  (NO RACE VARIABLE): >60 ML/MIN/1.73 M^2
ESTIMATED AVG GLUCOSE: 111 MG/DL (ref 68–131)
ESTIMATED AVG GLUCOSE: 111 MG/DL (ref 68–131)
FOLATE SERPL-MCNC: 15.6 NG/ML (ref 4–24)
GLUCOSE SERPL-MCNC: 89 MG/DL (ref 70–110)
HBA1C MFR BLD: 5.5 % (ref 4–5.6)
HBA1C MFR BLD: 5.5 % (ref 4–5.6)
HCT VFR BLD AUTO: 42.2 % (ref 37–48.5)
HDLC SERPL-MCNC: 56 MG/DL (ref 40–75)
HDLC SERPL: 23 % (ref 20–50)
HGB BLD-MCNC: 13.8 G/DL (ref 12–16)
IMM GRANULOCYTES # BLD AUTO: 0.01 K/UL (ref 0–0.04)
IMM GRANULOCYTES NFR BLD AUTO: 0.2 % (ref 0–0.5)
LDLC SERPL CALC-MCNC: 159 MG/DL (ref 63–159)
LYMPHOCYTES # BLD AUTO: 2.4 K/UL (ref 1–4.8)
LYMPHOCYTES NFR BLD: 36.8 % (ref 18–48)
MCH RBC QN AUTO: 29.6 PG (ref 27–31)
MCHC RBC AUTO-ENTMCNC: 32.7 G/DL (ref 32–36)
MCV RBC AUTO: 90 FL (ref 82–98)
MONOCYTES # BLD AUTO: 0.6 K/UL (ref 0.3–1)
MONOCYTES NFR BLD: 9.9 % (ref 4–15)
NEUTROPHILS # BLD AUTO: 3.3 K/UL (ref 1.8–7.7)
NEUTROPHILS NFR BLD: 51.4 % (ref 38–73)
NONHDLC SERPL-MCNC: 188 MG/DL
NRBC BLD-RTO: 0 /100 WBC
PLATELET # BLD AUTO: 375 K/UL (ref 150–450)
PMV BLD AUTO: 10.2 FL (ref 9.2–12.9)
POTASSIUM SERPL-SCNC: 4.4 MMOL/L (ref 3.5–5.1)
PROT SERPL-MCNC: 8.1 G/DL (ref 6–8.4)
RBC # BLD AUTO: 4.67 M/UL (ref 4–5.4)
SODIUM SERPL-SCNC: 141 MMOL/L (ref 136–145)
T4 FREE SERPL-MCNC: 0.89 NG/DL (ref 0.71–1.51)
TRIGL SERPL-MCNC: 145 MG/DL (ref 30–150)
TSH SERPL DL<=0.005 MIU/L-ACNC: 2.07 UIU/ML (ref 0.4–4)
VIT B12 SERPL-MCNC: 555 PG/ML (ref 210–950)
WBC # BLD AUTO: 6.46 K/UL (ref 3.9–12.7)

## 2024-04-16 ENCOUNTER — LAB VISIT (OUTPATIENT)
Dept: LAB | Facility: HOSPITAL | Age: 59
End: 2024-04-16
Attending: FAMILY MEDICINE
Payer: COMMERCIAL

## 2024-04-16 ENCOUNTER — OFFICE VISIT (OUTPATIENT)
Dept: FAMILY MEDICINE | Facility: CLINIC | Age: 59
End: 2024-04-16
Payer: COMMERCIAL

## 2024-04-16 VITALS
DIASTOLIC BLOOD PRESSURE: 72 MMHG | BODY MASS INDEX: 29.94 KG/M2 | TEMPERATURE: 98 F | WEIGHT: 162.69 LBS | HEIGHT: 62 IN | OXYGEN SATURATION: 95 % | HEART RATE: 86 BPM | SYSTOLIC BLOOD PRESSURE: 138 MMHG

## 2024-04-16 DIAGNOSIS — M79.89 SWELLING OF LOWER LEG: ICD-10-CM

## 2024-04-16 DIAGNOSIS — I10 ESSENTIAL HYPERTENSION, BENIGN: Chronic | ICD-10-CM

## 2024-04-16 DIAGNOSIS — M79.89 SWELLING OF LOWER LEG: Primary | ICD-10-CM

## 2024-04-16 DIAGNOSIS — Z12.11 ENCOUNTER FOR SCREENING FOR MALIGNANT NEOPLASM OF COLON: ICD-10-CM

## 2024-04-16 PROCEDURE — 99214 OFFICE O/P EST MOD 30 MIN: CPT | Mod: S$GLB,,, | Performed by: FAMILY MEDICINE

## 2024-04-16 PROCEDURE — 1159F MED LIST DOCD IN RCRD: CPT | Mod: CPTII,S$GLB,, | Performed by: FAMILY MEDICINE

## 2024-04-16 PROCEDURE — 36415 COLL VENOUS BLD VENIPUNCTURE: CPT | Mod: PO | Performed by: FAMILY MEDICINE

## 2024-04-16 PROCEDURE — 80053 COMPREHEN METABOLIC PANEL: CPT | Performed by: FAMILY MEDICINE

## 2024-04-16 PROCEDURE — 83880 ASSAY OF NATRIURETIC PEPTIDE: CPT | Performed by: FAMILY MEDICINE

## 2024-04-16 PROCEDURE — 3078F DIAST BP <80 MM HG: CPT | Mod: CPTII,S$GLB,, | Performed by: FAMILY MEDICINE

## 2024-04-16 PROCEDURE — 99999 PR PBB SHADOW E&M-EST. PATIENT-LVL III: CPT | Mod: PBBFAC,,, | Performed by: FAMILY MEDICINE

## 2024-04-16 PROCEDURE — 4010F ACE/ARB THERAPY RXD/TAKEN: CPT | Mod: CPTII,S$GLB,, | Performed by: FAMILY MEDICINE

## 2024-04-16 PROCEDURE — 3044F HG A1C LEVEL LT 7.0%: CPT | Mod: CPTII,S$GLB,, | Performed by: FAMILY MEDICINE

## 2024-04-16 PROCEDURE — 3075F SYST BP GE 130 - 139MM HG: CPT | Mod: CPTII,S$GLB,, | Performed by: FAMILY MEDICINE

## 2024-04-16 PROCEDURE — 3008F BODY MASS INDEX DOCD: CPT | Mod: CPTII,S$GLB,, | Performed by: FAMILY MEDICINE

## 2024-04-16 RX ORDER — HYDROCHLOROTHIAZIDE 12.5 MG/1
12.5 TABLET ORAL DAILY
Qty: 30 TABLET | Refills: 11 | Status: SHIPPED | OUTPATIENT
Start: 2024-04-16 | End: 2024-06-05 | Stop reason: ALTCHOICE

## 2024-04-16 NOTE — PROGRESS NOTES
[facility]  Progress Note    SUBJECTIVE:     Chief Complaint   Patient presents with    Leg Swelling     X 2 weeks   Both legs        HPI   Anusha Leon  is a 59 y.o. female here for c/o lower leg swelling. Has been going on for the past 2 weeks and in both legs. No chest pain, SOB. Patient has no other new complaints/problems at this time.      Review of patient's allergies indicates:   Allergen Reactions    Benadryl [diphenhydramine hcl] Other (See Comments)     Increases Heart Rate     Pcn [penicillins] Swelling    Amlodipine Other (See Comments)     Lower leg swelling       Past Medical History:   Diagnosis Date    Hypertension      Past Surgical History:   Procedure Laterality Date    CHOLECYSTECTOMY      EYE SURGERY Bilateral 2009    HYSTERECTOMY  2002    [facility]   ([facility])     TRIGGER FINGER RELEASE Right      Family History   Problem Relation Name Age of Onset    Heart disease Mother      Diabetes Mother      Stroke Neg Hx      Breast cancer Neg Hx      Colon cancer Neg Hx      Ovarian cancer Neg Hx       Social History     Tobacco Use    Smoking status: Never    Smokeless tobacco: Never   Substance Use Topics    Alcohol use: Yes     Comment: Social     Drug use: No        Review of Systems   Constitutional:  Negative for chills and fever.   HENT: Negative.     Respiratory: Negative.  Negative for shortness of breath.    Cardiovascular:  Positive for leg swelling. Negative for chest pain.   Gastrointestinal: Negative.  Negative for abdominal pain, nausea and vomiting.   Genitourinary: Negative.    Neurological:  Negative for headaches.   All other systems reviewed and are negative.    OBJECTIVE:     Vitals:    04/16/24 1316   BP: 138/72   Pulse: 86   Temp: 98 °F (36.7 °C)     Body mass index is 29.76 kg/m².    Physical Exam  Constitutional:       General: She is not in acute distress.     Appearance: Normal appearance. She is not diaphoretic.   HENT:      Head: Normocephalic and atraumatic.    Eyes:      Conjunctiva/sclera: Conjunctivae normal.   Pulmonary:      Effort: Pulmonary effort is normal.   Musculoskeletal:      Right lower leg: Edema present.      Left lower leg: Edema (2+ pitting edema b/l) present.   Neurological:      Mental Status: She is alert and oriented to person, place, and time.         Old records were reviewed. Labs and/or images were independently reviewed.    ASSESSMENT     1. Swelling of lower leg    2. Essential hypertension, benign    3. Encounter for screening for malignant neoplasm of colon        PLAN:     Swelling of lower leg  -     B-TYPE NATRIURETIC PEPTIDE; Future; Expected date: 04/16/2024  -     Comprehensive Metabolic Panel; Future  -     check BNP to r/o CHF. Likely due to amlodipine. Stop amlodipine.     Essential hypertension, benign  -     Start hydroCHLOROthiazide (HYDRODIURIL) 12.5 MG Tab; Take 1 tablet (12.5 mg total) by mouth once daily.  Dispense: 30 tablet; Refill: 11  -     Educated patient to take medications as prescribed, and to record bp logs daily to bring along to next visit. Instructed patient to decrease salt intake. Also told patient to call if any new signs or symptoms develop.    Encounter for screening for malignant neoplasm of colon  -     Mammo Digital Screening Bilat; Future; Expected date: 04/16/2024      RTC KYRA Allen MD  04/16/2024 1:29 PM

## 2024-04-17 LAB
ALBUMIN SERPL BCP-MCNC: 4 G/DL (ref 3.5–5.2)
ALP SERPL-CCNC: 141 U/L (ref 55–135)
ALT SERPL W/O P-5'-P-CCNC: 37 U/L (ref 10–44)
ANION GAP SERPL CALC-SCNC: 9 MMOL/L (ref 8–16)
AST SERPL-CCNC: 37 U/L (ref 10–40)
BILIRUB SERPL-MCNC: 0.5 MG/DL (ref 0.1–1)
BNP SERPL-MCNC: 11 PG/ML (ref 0–99)
BUN SERPL-MCNC: 13 MG/DL (ref 8–23)
CALCIUM SERPL-MCNC: 9.9 MG/DL (ref 8.7–10.5)
CHLORIDE SERPL-SCNC: 104 MMOL/L (ref 95–110)
CO2 SERPL-SCNC: 27 MMOL/L (ref 23–29)
CREAT SERPL-MCNC: 0.8 MG/DL (ref 0.5–1.4)
EST. GFR  (NO RACE VARIABLE): >60 ML/MIN/1.73 M^2
GLUCOSE SERPL-MCNC: 77 MG/DL (ref 70–110)
POTASSIUM SERPL-SCNC: 4.1 MMOL/L (ref 3.5–5.1)
PROT SERPL-MCNC: 7.8 G/DL (ref 6–8.4)
SODIUM SERPL-SCNC: 140 MMOL/L (ref 136–145)

## 2024-04-18 LAB — NONINV COLON CA DNA+OCC BLD SCRN STL QL: NEGATIVE

## 2024-04-24 ENCOUNTER — HOSPITAL ENCOUNTER (OUTPATIENT)
Dept: RADIOLOGY | Facility: HOSPITAL | Age: 59
Discharge: HOME | End: 2024-04-24
Attending: FAMILY MEDICINE
Payer: COMMERCIAL

## 2024-04-24 DIAGNOSIS — Z12.11 ENCOUNTER FOR SCREENING FOR MALIGNANT NEOPLASM OF COLON: ICD-10-CM

## 2024-04-24 PROCEDURE — 77067 SCR MAMMO BI INCL CAD: CPT | Mod: 26,,, | Performed by: RADIOLOGY

## 2024-04-24 PROCEDURE — 77067 SCR MAMMO BI INCL CAD: CPT | Mod: TC

## 2024-04-24 PROCEDURE — 77063 BREAST TOMOSYNTHESIS BI: CPT | Mod: 26,,, | Performed by: RADIOLOGY

## 2024-06-05 ENCOUNTER — OFFICE VISIT (OUTPATIENT)
Dept: FAMILY MEDICINE | Facility: CLINIC | Age: 59
End: 2024-06-05
Payer: COMMERCIAL

## 2024-06-05 VITALS
HEART RATE: 76 BPM | BODY MASS INDEX: 28.98 KG/M2 | TEMPERATURE: 98 F | DIASTOLIC BLOOD PRESSURE: 70 MMHG | SYSTOLIC BLOOD PRESSURE: 128 MMHG | OXYGEN SATURATION: 97 % | WEIGHT: 157.5 LBS | HEIGHT: 62 IN

## 2024-06-05 DIAGNOSIS — E03.9 HYPOTHYROIDISM, UNSPECIFIED TYPE: ICD-10-CM

## 2024-06-05 DIAGNOSIS — K21.9 GASTROESOPHAGEAL REFLUX DISEASE, UNSPECIFIED WHETHER ESOPHAGITIS PRESENT: ICD-10-CM

## 2024-06-05 DIAGNOSIS — I10 ESSENTIAL HYPERTENSION, BENIGN: Primary | Chronic | ICD-10-CM

## 2024-06-05 PROCEDURE — 3074F SYST BP LT 130 MM HG: CPT | Mod: CPTII,S$GLB,,

## 2024-06-05 PROCEDURE — 3078F DIAST BP <80 MM HG: CPT | Mod: CPTII,S$GLB,,

## 2024-06-05 PROCEDURE — 1159F MED LIST DOCD IN RCRD: CPT | Mod: CPTII,S$GLB,,

## 2024-06-05 PROCEDURE — 4010F ACE/ARB THERAPY RXD/TAKEN: CPT | Mod: CPTII,S$GLB,,

## 2024-06-05 PROCEDURE — 3008F BODY MASS INDEX DOCD: CPT | Mod: CPTII,S$GLB,,

## 2024-06-05 PROCEDURE — 1160F RVW MEDS BY RX/DR IN RCRD: CPT | Mod: CPTII,S$GLB,,

## 2024-06-05 PROCEDURE — 3044F HG A1C LEVEL LT 7.0%: CPT | Mod: CPTII,S$GLB,,

## 2024-06-05 PROCEDURE — 99999 PR PBB SHADOW E&M-EST. PATIENT-LVL IV: CPT | Mod: PBBFAC,,,

## 2024-06-05 PROCEDURE — 99214 OFFICE O/P EST MOD 30 MIN: CPT | Mod: S$GLB,,,

## 2024-06-05 RX ORDER — ESOMEPRAZOLE MAGNESIUM 40 MG/1
40 CAPSULE, DELAYED RELEASE ORAL
Qty: 90 CAPSULE | Refills: 1 | Status: SHIPPED | OUTPATIENT
Start: 2024-06-05 | End: 2025-06-05

## 2024-06-05 RX ORDER — LOSARTAN POTASSIUM AND HYDROCHLOROTHIAZIDE 12.5; 1 MG/1; MG/1
1 TABLET ORAL DAILY
Qty: 90 TABLET | Refills: 1 | Status: SHIPPED | OUTPATIENT
Start: 2024-06-05

## 2024-06-05 RX ORDER — LEVOTHYROXINE SODIUM 50 UG/1
50 TABLET ORAL DAILY
Qty: 90 TABLET | Refills: 1 | Status: SHIPPED | OUTPATIENT
Start: 2024-06-05

## 2024-06-05 NOTE — ASSESSMENT & PLAN NOTE
Lab Results   Component Value Date    TSH 2.066 02/15/2024     The current medical regimen is effective;  continue present plan and medications.

## 2024-06-05 NOTE — PATIENT INSTRUCTIONS
Discuss with Dr. Allen regarding weight loss meds.     Dietary changes  Intake of 1-1200 kcal/day for women (caloric deficit of 500-1000 per day)  Increase in physical activity  Examples of physical activities and their respective rate of caloric expenditure for a 100 kg patient are: walking at 3 miles per hour (350 kcal/hour); bicycling on level ground at 10-12 miles per hour (600 kcal/hour); jogging at 5 miles per hour (800 kcal/hour); swimming freestyle for 1 standard lap (1000 kcal/hour); running 7.5 miles per hour (1200 kcal/hour).

## 2024-06-05 NOTE — PROGRESS NOTES
HPI     Chief Complaint:  Chief Complaint   Patient presents with    Hypertension       Anusha Leon is a 59 y.o. female with multiple medical diagnoses as listed in the medical history and problem list that presents for   Chief Complaint   Patient presents with    Hypertension   .   Patient is not known to me with her last appointment in this department on 4/16/2024.      HPI    Leg swelling - seen 4/16 by PCP. Suspicious for swelling due to CCB, switched to hctz 12.5 and advised to stop amlodipine. BNP and CMP reviewed to r/o CHF. Slightly elevated alk phos (141).  Wt loss - pt would like medication for weight loss. Diet is good, watching what she eats. Limits sugar. Watches calories. Pt worried that she has gained so much weight. Use to be 140lbs and now almost 160lbs. Pt retired recently. Walks a lot.   Refill - needs refill of Nexium. Pt requesting 3 month supply of all medications. Pt would also like combination BP pill instead of 2 pills.  works for [employer] and they are on the road a lot. Now living in [place] but still like to come here to see Dr. Allen.     Other concerns below  Assessment & Plan       Problem List Items Addressed This Visit          Cardiac/Vascular    Essential hypertension, benign - Primary (Chronic)    Current Assessment & Plan     The current medical regimen is effective;  continue present plan and medications.              Endocrine    Hypothyroidism (Chronic)    Current Assessment & Plan     Lab Results   Component Value Date    TSH 2.066 02/15/2024     The current medical regimen is effective;  continue present plan and medications.           Relevant Medications    levothyroxine (SYNTHROID) 50 MCG tablet     Other Visit Diagnoses       Gastroesophageal reflux disease, unspecified whether esophagitis present    The current medical regimen is effective;  continue present plan and medications.        Relevant Medications    esomeprazole (NEXIUM) 40 MG capsule    BMI  28.0-28.9,adult      Discuss with PCP further regarding weight loss medications.     Dietary changes  Intake of 1-1200 kcal/day for women (caloric deficit of 500-1000 per day)  Increase in physical activity  Examples of physical activities and their respective rate of caloric expenditure for a 100 kg patient are: walking at 3 miles per hour (350 kcal/hour); bicycling on level ground at 10-12 miles per hour (600 kcal/hour); jogging at 5 miles per hour (800 kcal/hour); swimming freestyle for 1 standard lap (1000 kcal/hour); running 7.5 miles per hour (1200 kcal/hour).                --------------------------------------------      Health Maintenance:  Health Maintenance         Date Due Completion Date    RSV Vaccine (Age 60+ and Pregnant patients) (1 - 1-dose 60+ series) Never done ---    COVID-19 Vaccine (6 - 2023-24 season) 09/01/2023 1/26/2022    Mammogram 04/24/2025 4/24/2024    Hemoglobin A1c (Diabetic Prevention Screening) 02/15/2027 2/15/2024    Colorectal Cancer Screening 04/12/2027 4/12/2024    Override on 1/1/2013: Done    Lipid Panel 02/15/2029 2/15/2024    TETANUS VACCINE 05/19/2030 5/19/2020            Health maintenance reviewed    Follow Up:  Follow up if symptoms worsen or fail to improve.    Discussed DDx, condition, and treatment.   Education sent to patient portal/included in after visit summary.  ED precautions given.   Notify provider if symptoms do not resolve or increase in severity.   Patient verbalizes understanding and agrees with plan of care.    Exam     Review of Systems:  (as noted above)  Review of Systems    Physical Exam:   Physical Exam  Constitutional:       General: She is not in acute distress.     Appearance: Normal appearance. She is not toxic-appearing.   HENT:      Head: Normocephalic and atraumatic.   Cardiovascular:      Rate and Rhythm: Normal rate and regular rhythm.      Pulses: Normal pulses.      Heart sounds: Normal heart sounds. No murmur heard.  Pulmonary:       "Effort: Pulmonary effort is normal.      Breath sounds: Normal breath sounds.   Musculoskeletal:         General: No swelling.      Cervical back: Normal range of motion and neck supple. No rigidity.      Right lower leg: No edema.      Left lower leg: No edema.   Lymphadenopathy:      Cervical: No cervical adenopathy.   Skin:     General: Skin is warm.      Capillary Refill: Capillary refill takes less than 2 seconds.   Neurological:      General: No focal deficit present.      Mental Status: She is alert and oriented to person, place, and time.   Psychiatric:         Mood and Affect: Mood normal.       Vitals:    06/05/24 1132 06/05/24 1149   BP: (!) 140/70 128/70   Pulse: 76    Temp: 97.9 °F (36.6 °C)    TempSrc: Oral    SpO2: 97%    Weight: 71.5 kg (157 lb 8.3 oz)    Height: 5' 2" (1.575 m)       Body mass index is 28.81 kg/m².        History     Past Medical History:  Past Medical History:   Diagnosis Date    Hypertension        Past Surgical History:  Past Surgical History:   Procedure Laterality Date    CHOLECYSTECTOMY      EYE SURGERY Bilateral 2009    HYSTERECTOMY  2002    [facility]   ([facility])     TRIGGER FINGER RELEASE Right        Social History:  Social History     Socioeconomic History    Marital status:    Tobacco Use    Smoking status: Never    Smokeless tobacco: Never   Substance and Sexual Activity    Alcohol use: Yes     Comment: Social     Drug use: No    Sexual activity: Yes     Partners: Male     Birth control/protection: Surgical     Comment: /Engaged:     ([facility]  2002)     Social Determinants of Health     Food Insecurity: Food Insecurity Present (4/11/2024)    Hunger Vital Sign     Ran Out of Food in the Last Year: Sometimes true   Transportation Needs: No Transportation Needs (4/11/2024)    PRAPARE - Transportation     Lack of Transportation (Medical): No     Lack of Transportation (Non-Medical): No   Physical Activity: Unknown (4/11/2024)    Exercise Vital Sign     Days of Exercise " per Week: 4 days   Stress: Stress Concern Present (4/11/2024)    [employer] - Occupational Stress Questionnaire     Feeling of Stress : To some extent   Housing Stability: Unknown (4/11/2024)    Housing Stability Vital Sign     Unable to Pay for Housing in the Last Year: No       Family History:  Family History   Problem Relation Name Age of Onset    Heart disease Mother      Diabetes Mother      Stroke Neg Hx      Breast cancer Neg Hx      Colon cancer Neg Hx      Ovarian cancer Neg Hx         Allergies and Medications: (updated and reviewed)  Review of patient's allergies indicates:   Allergen Reactions    Benadryl [diphenhydramine hcl] Other (See Comments)     Increases Heart Rate     Pcn [penicillins] Swelling    Amlodipine Other (See Comments)     Lower leg swelling     Current Outpatient Medications   Medication Sig Dispense Refill    esomeprazole (NEXIUM) 40 MG capsule Take 1 capsule (40 mg total) by mouth before breakfast. 90 capsule 1    levothyroxine (SYNTHROID) 50 MCG tablet Take 1 tablet (50 mcg total) by mouth once daily. 90 tablet 1    losartan-hydrochlorothiazide 100-12.5 mg (HYZAAR) 100-12.5 mg Tab Take 1 tablet by mouth once daily. 90 tablet 1     No current facility-administered medications for this visit.       Patient Care Team:  Bobby Allen MD as PCP - General (Family Medicine)  Ricardo Barbosa III, MD as Consulting Physician (Orthopedic Surgery)         - The patient is given an After Visit Summary that lists all medications with directions, allergies, education, orders placed during this encounter and follow-up instructions.      - I have reviewed the patient's medical information including past medical, family, and social history sections including the medications and allergies.      - We discussed the patient's current medications.     This note was created by combination of typed  and MModal dictation.  Transcription errors may be present.  If  there are any questions, please contact me.

## 2024-09-05 DIAGNOSIS — I10 ESSENTIAL HYPERTENSION, BENIGN: Chronic | ICD-10-CM

## 2024-09-05 RX ORDER — HYDROCHLOROTHIAZIDE 12.5 MG/1
TABLET ORAL
Qty: 30 TABLET | Refills: 11 | OUTPATIENT
Start: 2024-09-05

## 2024-09-05 NOTE — TELEPHONE ENCOUNTER
No care due was identified.  [facility] Embedded Care Due Messages. Reference number: [id number].   9/05/2024 10:58:10 AM CDT

## 2024-09-05 NOTE — TELEPHONE ENCOUNTER
Refill Decision Note   Anusha Carolyn  is requesting a refill authorization.  Brief Assessment and Rationale for Refill:  Quick Discontinue     Medication Therapy Plan: HCTZ dc'd on 6/5/24-Alternate therapy      Comments:     Note composed:2:25 PM 09/05/2024

## 2024-09-10 DIAGNOSIS — K21.9 GASTROESOPHAGEAL REFLUX DISEASE, UNSPECIFIED WHETHER ESOPHAGITIS PRESENT: ICD-10-CM

## 2024-09-10 RX ORDER — ESOMEPRAZOLE MAGNESIUM 40 MG/1
40 CAPSULE, DELAYED RELEASE ORAL
Qty: 90 CAPSULE | Refills: 1 | Status: SHIPPED | OUTPATIENT
Start: 2024-09-10

## 2024-12-01 DIAGNOSIS — E03.9 HYPOTHYROIDISM, UNSPECIFIED TYPE: ICD-10-CM

## 2024-12-02 RX ORDER — LEVOTHYROXINE SODIUM 50 UG/1
TABLET ORAL
Qty: 90 TABLET | Refills: 1 | Status: SHIPPED | OUTPATIENT
Start: 2024-12-02

## 2024-12-03 RX ORDER — LOSARTAN POTASSIUM AND HYDROCHLOROTHIAZIDE 12.5; 1 MG/1; MG/1
1 TABLET ORAL
Qty: 90 TABLET | Refills: 0 | Status: SHIPPED | OUTPATIENT
Start: 2024-12-03

## 2025-04-04 DIAGNOSIS — E03.9 HYPOTHYROIDISM, UNSPECIFIED TYPE: ICD-10-CM

## 2025-04-04 RX ORDER — LEVOTHYROXINE SODIUM 50 UG/1
TABLET ORAL
Qty: 90 TABLET | Refills: 0 | Status: SHIPPED | OUTPATIENT
Start: 2025-04-04

## 2025-04-30 DIAGNOSIS — I10 ESSENTIAL HYPERTENSION, BENIGN: ICD-10-CM

## 2025-04-30 DIAGNOSIS — Z12.31 OTHER SCREENING MAMMOGRAM: ICD-10-CM

## 2025-05-05 ENCOUNTER — PATIENT MESSAGE (OUTPATIENT)
Dept: ADMINISTRATIVE | Facility: HOSPITAL | Age: 60
End: 2025-05-05
Payer: COMMERCIAL

## 2025-05-13 RX ORDER — LOSARTAN POTASSIUM AND HYDROCHLOROTHIAZIDE 12.5; 1 MG/1; MG/1
1 TABLET ORAL
Qty: 30 TABLET | Refills: 0 | Status: SHIPPED | OUTPATIENT
Start: 2025-05-13

## 2025-06-09 ENCOUNTER — PATIENT MESSAGE (OUTPATIENT)
Dept: ADMINISTRATIVE | Facility: HOSPITAL | Age: 60
End: 2025-06-09
Payer: COMMERCIAL

## 2025-07-04 DIAGNOSIS — E03.9 HYPOTHYROIDISM, UNSPECIFIED TYPE: ICD-10-CM

## 2025-07-04 NOTE — TELEPHONE ENCOUNTER
No care due was identified.  [facility] Embedded Care Due Messages. Reference number: [id number].   7/04/2025 3:13:14 AM CDT

## 2025-07-06 NOTE — TELEPHONE ENCOUNTER
Refill Routing Note   Medication(s) are not appropriate for processing by [facility] for the following reason(s):        Required labs outdated-TSH  Due for OV soon    [facility] action(s):  Defer        Medication Therapy Plan: TSH       Appointments  past 12m or future 3m with PCP    Date Provider   Last Visit   2024 Bobby Allen MD   Next Visit   Visit date not found Bobby Allen MD   ED visits in past 90 days: 0        Note composed:12:18 PM 2025

## 2025-07-07 RX ORDER — LEVOTHYROXINE SODIUM 50 UG/1
TABLET ORAL
Qty: 90 TABLET | Refills: 0 | OUTPATIENT
Start: 2025-07-07